# Patient Record
Sex: FEMALE | Race: WHITE | Employment: FULL TIME | URBAN - METROPOLITAN AREA
[De-identification: names, ages, dates, MRNs, and addresses within clinical notes are randomized per-mention and may not be internally consistent; named-entity substitution may affect disease eponyms.]

---

## 2017-01-17 ENCOUNTER — TELEPHONE (OUTPATIENT)
Dept: CARDIOLOGY CLINIC | Age: 62
End: 2017-01-17

## 2017-01-17 NOTE — TELEPHONE ENCOUNTER
The patient called to let you know the her BP is not improving. She is experiencing high BP. She can be reached at [phone]. Thanks!

## 2017-01-17 NOTE — TELEPHONE ENCOUNTER
Patient identified with 2 identifiers  Patient started full pill of spironolactone 25 mg on 12-31-17. /97 today, 134/85 later in day, states she has had many fluctuations past two weeks, associated with a headache. She is concerned and wants to know if she needs more blood pressure medication. Things she wants you to know: Both parents were treated for HTN  Norvasc causes her to have migraines  Lisinopril causes dizziness and a metalic taste  She has been treated in the past with propranolol for menstrual migraines, but had a very low Hr with it and Dr. Lujan Grew succesfully weaned her off, at which time she started spironolactone for the HTN. Please advise.

## 2017-01-18 NOTE — TELEPHONE ENCOUNTER
Patient identified with 2 identifiers  Returned patient phone call and instructed her per Dr. Eric Parker to continue to take blood pressures for next two weeks and call back with readings. Call before that for symptoms of chest pain, shortness of breath, extreme weakness, headache-extreme, or blood pressure that is suddenly not controlled. She states understanding.

## 2017-01-18 NOTE — TELEPHONE ENCOUNTER
I want her to watch her bp for 6 weeks on the spironolactone, so two more before we decide. Can take that long to have fulleffect. Check bp am and pm and keep the log.

## 2017-02-08 ENCOUNTER — TELEPHONE (OUTPATIENT)
Dept: CARDIOLOGY CLINIC | Age: 62
End: 2017-02-08

## 2017-02-08 NOTE — TELEPHONE ENCOUNTER
Left VMM with patient her blood pressure readings have been received. After Dr. Zachary Swanson has reviewed them we will call her back with an update.

## 2017-02-08 NOTE — TELEPHONE ENCOUNTER
Patient called regarding a fax she sent for her BP readings. Shes wants to verify it was received.  She can be reached at [phone]

## 2017-02-09 NOTE — TELEPHONE ENCOUNTER
Left message for patient that I have received readings and Dr. Machelle Pantoja has reviewed them. She wants her to continue to take her blood pressures and send in readings in 2 more months, patient to call back for problems or questions.

## 2017-04-17 ENCOUNTER — TELEPHONE (OUTPATIENT)
Dept: CARDIOLOGY CLINIC | Age: 62
End: 2017-04-17

## 2017-04-17 NOTE — TELEPHONE ENCOUNTER
Left message for patient that we have received her blood pressure readings by fax and will be in touch once Dr. Princess Winston has reviewed them.

## 2017-04-19 ENCOUNTER — DOCUMENTATION ONLY (OUTPATIENT)
Dept: CARDIOLOGY CLINIC | Age: 62
End: 2017-04-19

## 2017-04-19 ENCOUNTER — TELEPHONE (OUTPATIENT)
Dept: CARDIOLOGY CLINIC | Age: 62
End: 2017-04-19

## 2017-04-19 NOTE — TELEPHONE ENCOUNTER
Left message for patient to return call for questions, otherwise, left message on voicemail with full name that blood pressures look good and to continue current treatment.

## 2017-04-19 NOTE — PROGRESS NOTES
Please let her know that Dr. Jagdish Quintana reviewed her BP readings and no changes need to be made for now.

## 2017-05-04 ENCOUNTER — OFFICE VISIT (OUTPATIENT)
Dept: INTERNAL MEDICINE CLINIC | Age: 62
End: 2017-05-04

## 2017-05-04 VITALS
HEIGHT: 66 IN | RESPIRATION RATE: 16 BRPM | OXYGEN SATURATION: 94 % | SYSTOLIC BLOOD PRESSURE: 122 MMHG | BODY MASS INDEX: 20.38 KG/M2 | HEART RATE: 63 BPM | DIASTOLIC BLOOD PRESSURE: 86 MMHG | WEIGHT: 126.8 LBS

## 2017-05-04 DIAGNOSIS — R73.09 ELEVATED GLUCOSE: Primary | ICD-10-CM

## 2017-05-04 LAB — HBA1C MFR BLD HPLC: 5.6 %

## 2017-05-04 NOTE — PROGRESS NOTES
HPI:  Yaneli Murphy is a 64y.o. year old female who returns to clinic today for follow up:   Elevated sugar: last a1c 5.8. She does decrease sugar and carbs but still having some in her diet. Exercise: walking. HTN: followed by cardiology and well controlled on her current medication. Current Outpatient Prescriptions   Medication Sig Dispense Refill    spironolactone (ALDACTONE) 25 mg tablet Take 1 Tab by mouth daily. 90 Tab 3    ASPIRIN/SALICYLAMIDE/CAFFEINE (BC HEADACHE POWDER PO) Take  by mouth three (3) times daily as needed.  CHOLECALCIFEROL, VITAMIN D3, (VITAMIN D3 PO) Take 400 Units by mouth two (2) times a day.  CALCIUM PO Take 630 mg by mouth two (2) times a day. With vitamin d 500 mg      fluticasone (FLONASE) 50 mcg/actuation nasal spray 2 Sprays by Both Nostrils route daily. (Patient taking differently: 2 Sprays by Both Nostrils route daily as needed.) 1 Bottle 5    ascorbic acid (VITAMIN C) 500 mg tablet Take 500 mg by mouth two (2) times a day.  BIOTIN PO Take 5,000 mcg by mouth daily.  DOCUSATE CALCIUM (STOOL SOFTENER PO) Take  by mouth.  tretinoin, emollient, (RENOVA) 0.02 % topical cream Apply  to affected area nightly.  vitamin E (AQUA GEMS) 400 unit capsule Take  by mouth daily.  fiber Cap Take  by mouth. Allergies   Allergen Reactions    Sulfa (Sulfonamide Antibiotics) Rash     Social History   Substance Use Topics    Smoking status: Former Smoker     Packs/day: 1.00     Years: 4.00     Quit date: 1/1/1978    Smokeless tobacco: Never Used    Alcohol use 2.4 oz/week     4 Glasses of wine, 0 Standard drinks or equivalent per week      Comment: socially         Review of Systems   Respiratory: Negative for shortness of breath. Cardiovascular: Negative for chest pain and leg swelling. Gastrointestinal: Negative for abdominal pain and heartburn. Genitourinary: Negative for dysuria, frequency and urgency.        Physical Exam   Constitutional: She appears well-developed. No distress. /86  Pulse 63  Resp 16  Ht 5' 5.5\" (1.664 m)  Wt 126 lb 12.8 oz (57.5 kg)  LMP 04/01/2007  SpO2 94%  BMI 20.78 kg/m2   Cardiovascular: Normal rate, regular rhythm, normal heart sounds and intact distal pulses. No murmur heard. Pulmonary/Chest: Effort normal and breath sounds normal. She has no wheezes. Abdominal: Soft. Bowel sounds are normal. She exhibits no distension and no mass. There is no tenderness. Musculoskeletal: She exhibits no edema. Vitals reviewed. Assessment & Plan:    ICD-10-CM ICD-9-CM    1. Elevated glucose R73.09 790.29 AMB POC HEMOGLOBIN A1C      LIPID PANEL      METABOLIC PANEL, COMPREHENSIVE      CBC WITH AUTOMATED DIFF   a1c improved to 5.6. Continue with low sugar, low carbohydrate diet, and exercise. Follow-up Disposition:  Return for keep upcoming physical appt. .   Advised her to call back or return to office if symptoms worsen/change/persist.  Discussed expected course/resolution/complications of diagnosis in detail with patient. Medication risks/benefits/costs/interactions/alternatives discussed with patient. She was given an after visit summary which includes diagnoses, current medications, & vitals. She expressed understanding with the diagnosis and plan.

## 2017-05-08 ENCOUNTER — TELEPHONE (OUTPATIENT)
Dept: INTERNAL MEDICINE CLINIC | Age: 62
End: 2017-05-08

## 2017-05-10 NOTE — TELEPHONE ENCOUNTER
Pt calling asking does A1C in the office have same accuracy as blood draw and what is the length of the accumulation for the [facility]. Informed pt yes it does have same accuracy as blood draw and it is an accumulation of 3 months. Pt verbalized understanding.

## 2017-05-30 ENCOUNTER — HOSPITAL ENCOUNTER (OUTPATIENT)
Dept: MAMMOGRAPHY | Age: 62
Discharge: HOME | End: 2017-05-30
Attending: OBSTETRICS & GYNECOLOGY
Payer: COMMERCIAL

## 2017-05-30 DIAGNOSIS — Z12.31 VISIT FOR SCREENING MAMMOGRAM: ICD-10-CM

## 2017-05-30 PROCEDURE — 77067 SCR MAMMO BI INCL CAD: CPT

## 2017-06-01 ENCOUNTER — OFFICE VISIT (OUTPATIENT)
Dept: CARDIOLOGY CLINIC | Age: 62
End: 2017-06-01

## 2017-06-01 VITALS
RESPIRATION RATE: 16 BRPM | BODY MASS INDEX: 20.7 KG/M2 | HEART RATE: 60 BPM | HEIGHT: 66 IN | DIASTOLIC BLOOD PRESSURE: 80 MMHG | WEIGHT: 128.8 LBS | SYSTOLIC BLOOD PRESSURE: 150 MMHG

## 2017-06-01 DIAGNOSIS — I10 ESSENTIAL HYPERTENSION: Primary | ICD-10-CM

## 2017-06-01 PROBLEM — R00.1 SINUS BRADYCARDIA: Status: ACTIVE | Noted: 2017-06-01

## 2017-06-01 RX ORDER — SPIRONOLACTONE 25 MG/1
25 TABLET ORAL DAILY
Qty: 90 TAB | Refills: 3 | Status: SHIPPED | OUTPATIENT
Start: 2017-06-01 | End: 2018-03-06 | Stop reason: SDUPTHER

## 2017-06-01 NOTE — PROGRESS NOTES
[place]: Ling Velazquez  [phone]   best by MD  HPI: Glenda Mckinnon, a 58y.o. year-old who presents for follow-up on her HTN and bradycardia. Bradycardia has resolved off propanolol  Reviewed her BP readings today - had some SBP in the 140mmHg range on a particularly stressful day  Had one SBP in the 150mmHg range when she was rushing to an appointment  Taking spironolactone without difficulty, plans to have CMP checked with Dr. Margarita Lehman next month  Denies any chest pain or dyspnea with exertion  No palpitations  Denies headaches or dizziness  No syncope  Exercises daily - walks x 30 minutes to 1 hour   Denies any LE edema  Uses No Salt on her food, washes any canned vegetables that she eats     Assessment/Plan:  1. Bradycardia- rate 60 today, looks fine, resolved off propranolol  2. White coat HTN- /80 today in the office, most home readings with -130mmHg on spironolactone 25mg daily, no changes for now, advised her to check her BP twice daily for a few weeks leading up to her annual follow up next June 2018   -hx of migraine headaches on norvasc (she took it for a few weeks)  -hx of dizziness and metallic taste with lisinopril  -hx of bradycardia on propanolol  3. Dyslipidemia- LDL 99 in 2015, plans to have fasting lipids checked by Dr. Margarita Lehman next month   4. Abnormal ECG - non-specific ST-T wave changes today, likely related to lead placement, asymptomatic currently     Echo EF 73%, mild MR  Holter 43-87 PAC    Soc one glass of wine a day, no tobacco  FHx no early CAD    She  has a past medical history of Colonic polyp (7/05); Cough variant asthma ( ); Degenerative arthritis of lumbar spine (3/11); Insomnia, unspecified ( ); Menstrual migraine ( ); Other and unspecified hyperlipidemia; Rectocele; and Vasovagal syncope (3/24/2016).     Cardiovascular ROS: no chest pain or dyspnea on exertion  Respiratory ROS: no cough, shortness of breath, or wheezing  Neurological ROS: no TIA or stroke symptoms  All other systems negative except as above. PE  Vitals:    06/01/17 1137   BP: 150/80   Pulse: 60   Resp: 16   Weight: 128 lb 12.8 oz (58.4 kg)   Height: 5' 5.5\" (1.664 m)    Body mass index is 21.11 kg/(m^2).    General appearance - alert, well appearing, and in no distress  Mental status - affect appropriate to mood  Eyes - sclera anicteric, moist mucous membranes  Neck - supple, no significant adenopathy  Lymphatics - no  lymphadenopathy  Chest - clear to auscultation, no wheezes, rales or rhonchi  Heart - normal rate, regular rhythm, normal S1, S2, no murmurs, rubs, clicks or gallops  Abdomen - soft, nontender, nondistended, no masses or organomegaly  Back exam - full range of motion, no tenderness  Neurological - cranial nerves II through XII grossly intact, no focal deficit  Musculoskeletal - no muscular tenderness noted, normal strength  Extremities - peripheral pulses normal, no pedal edema  Skin - normal coloration  no rashes    12 lead ECG: NSR with non-specific ST-T wave changes     Recent Labs:  Lab Results   Component Value Date/Time    Cholesterol, total 223 06/20/2017 08:10 AM    HDL Cholesterol 97 06/20/2017 08:10 AM    LDL, calculated 112 06/20/2017 08:10 AM    Triglyceride 71 06/20/2017 08:10 AM    CHOL/HDL Ratio 2.0 05/21/2009 09:59 AM     Lab Results   Component Value Date/Time    Creatinine 0.83 06/20/2017 08:10 AM     Lab Results   Component Value Date/Time    BUN 20 06/20/2017 08:10 AM     Lab Results   Component Value Date/Time    Potassium 4.3 06/20/2017 08:10 AM     Lab Results   Component Value Date/Time    Hemoglobin A1c 5.8 06/16/2016 08:02 AM    Hemoglobin A1c, External 5.8 04/15/2016     Lab Results   Component Value Date/Time    HGB 15.4 06/20/2017 08:10 AM     Lab Results   Component Value Date/Time    PLATELET 885 40/02/6460 08:10 AM       Reviewed:  Past Medical History:   Diagnosis Date    Colonic polyp 7/05         Cough variant asthma      Degenerative arthritis of lumbar spine 3/11    Eduardo/os    Insomnia, unspecified      Menstrual migraine      Other and unspecified hyperlipidemia     mild    Rectocele     Vasovagal syncope 3/24/2016     History   Smoking Status    Former Smoker    Packs/day: 1.00    Years: 4.00    Quit date: 1/1/1978   Smokeless Tobacco    Never Used     History   Alcohol Use    2.4 oz/week    4 Glasses of wine, 0 Standard drinks or equivalent per week     Comment: socially     Allergies   Allergen Reactions    Sulfa (Sulfonamide Antibiotics) Rash       Current Outpatient Prescriptions   Medication Sig    CALCIUM CITRATE PO Take 630 mg by mouth two (2) times a day.  spironolactone (ALDACTONE) 25 mg tablet Take 1 Tab by mouth daily.  ASPIRIN/SALICYLAMIDE/CAFFEINE (BC HEADACHE POWDER PO) Take  by mouth three (3) times daily as needed.  CHOLECALCIFEROL, VITAMIN D3, (VITAMIN D3 PO) Take 900 Units by mouth two (2) times a day.  fluticasone (FLONASE) 50 mcg/actuation nasal spray 2 Sprays by Both Nostrils route daily. (Patient taking differently: 2 Sprays by Both Nostrils route daily as needed.)    ascorbic acid (VITAMIN C) 500 mg tablet Take 500 mg by mouth two (2) times a day.  BIOTIN PO Take 5,000 mcg by mouth daily.  DOCUSATE CALCIUM (STOOL SOFTENER PO) Take  by mouth.  tretinoin, emollient, (RENOVA) 0.02 % topical cream Apply  to affected area nightly.  vitamin E (AQUA GEMS) 400 unit capsule Take  by mouth daily.  fiber Cap Take  by mouth. No current facility-administered medications for this visit.         Nitin Yadav MD  [facility] [place]  [address] [facility] [address]  [address]

## 2017-06-01 NOTE — PATIENT INSTRUCTIONS
Please check your blood pressure twice daily for a few weeks just prior to your next appointment  You don't need to check it regularly for now

## 2017-06-01 NOTE — MR AVS SNAPSHOT
Visit Information Date & Time Provider Department Dept. Phone Encounter #  
 6/1/2017 11:20 AM Brandyn Britt MD [facility] [phone] [id number] Your Appointments 6/27/2017  9:15 AM  
WELL WOMAN EXAM with Sandra Andersen MD  
[facility]) Appt Note: cpe no pap  
 [address] [facility] [address]  
[address] [address] [address] [place] Upcoming Health Maintenance Date Due  
 PAP AKA CERVICAL CYTOLOGY 3/26/2017 INFLUENZA AGE 9 TO ADULT 8/1/2017 BREAST CANCER SCRN MAMMOGRAM 5/30/2019 COLONOSCOPY 10/18/2021 DTaP/Tdap/Td series (2 - Td) 5/8/2024 Allergies as of 6/1/2017  Review Complete On: 6/1/2017 By: Laura Harley Severity Noted Reaction Type Reactions Sulfa (Sulfonamide Antibiotics)  03/03/2010    Rash Current Immunizations  Reviewed on 6/21/2016 Name Date Influenza Vaccine 10/1/2015 Pneumococcal Polysaccharide (PPSV-23) 6/16/2015  9:46 AM  
 Td 5/5/2006 Tdap 5/8/2014 Not reviewed this visit You Were Diagnosed With   
  
 Codes Comments Essential hypertension    -  Primary ICD-10-CM: I10 
ICD-9-CM: 401.9 Vitals BP Pulse Resp Height(growth percentile) Weight(growth percentile) LMP  
 150/80 (BP 1 Location: Right arm, BP Patient Position: Sitting) 60 16 5' 5.5\" (1.664 m) 128 lb 12.8 oz (58.4 kg) 04/01/2007 BMI OB Status Smoking Status 21.11 kg/m2 Postmenopausal Former Smoker Vitals History BMI and BSA Data Body Mass Index Body Surface Area  
 21.11 kg/m 2 1.64 m 2 Preferred Pharmacy Pharmacy Name Phone [facility]  [place], [address] [facility]. [phone] Your Updated Medication List  
  
   
This list is accurate as of: 6/1/17 12:09 PM.  Always use your most recent med list.  
  
  
  
  
 BC HEADACHE POWDER PO Take  by mouth three (3) times daily as needed. BIOTIN PO Take 5,000 mcg by mouth daily. CALCIUM CITRATE PO Take 630 mg by mouth two (2) times a day. fiber Cap Take  by mouth. fluticasone 50 mcg/actuation nasal spray Commonly known as:  Jose Marrow 2 Sprays by Both Nostrils route daily. RENOVA 0.02 % topical cream  
Generic drug:  tretinoin (emollient) Apply  to affected area nightly. spironolactone 25 mg tablet Commonly known as:  ALDACTONE Take 1 Tab by mouth daily. STOOL SOFTENER PO Take  by mouth. VITAMIN C 500 mg tablet Generic drug:  ascorbic acid (vitamin C) Take 500 mg by mouth two (2) times a day. VITAMIN D3 PO Take 900 Units by mouth two (2) times a day. vitamin E 400 unit capsule Commonly known as:  [address] Take  by mouth daily. Prescriptions Printed Refills  
 spironolactone (ALDACTONE) 25 mg tablet 3 Sig: Take 1 Tab by mouth daily. Class: Print Route: Oral  
  
We Performed the Following AMB POC EKG ROUTINE W/ 12 LEADS, INTER & REP [84315 CPT(R)] Patient Instructions Please check your blood pressure twice daily for a few weeks just prior to your next appointment You don't need to check it regularly for now Introducing [facility]! Dear Morro Galan: 
Thank you for requesting a Bilbus account. Our records indicate that you already have an active Bilbus account. You can access your account anytime at [email or web address] Did you know that you can access your hospital and ER discharge instructions at any time in Bilbus? You can also review all of your test results from your hospital stay or ER visit. Additional Information If you have questions, please visit the Frequently Asked Questions section of the Bilbus website at [email or web address] Remember, MyChart is NOT to be used for urgent needs. For medical emergencies, dial 911. Now available from your iPhone and Android! Please provide this summary of care documentation to your next provider. Your primary care clinician is listed as Marianela Pena. If you have any questions after today's visit, please call [phone].

## 2017-06-21 LAB
ALBUMIN SERPL-MCNC: 4.4 G/DL (ref 3.6–4.8)
ALBUMIN/GLOB SERPL: 2.4 {RATIO} (ref 1.2–2.2)
ALP SERPL-CCNC: 60 IU/L (ref 39–117)
ALT SERPL-CCNC: 15 IU/L (ref 0–32)
AST SERPL-CCNC: 23 IU/L (ref 0–40)
BASOPHILS # BLD AUTO: 0 X10E3/UL (ref 0–0.2)
BASOPHILS NFR BLD AUTO: 1 %
BILIRUB SERPL-MCNC: 0.2 MG/DL (ref 0–1.2)
BUN SERPL-MCNC: 20 MG/DL (ref 8–27)
BUN/CREAT SERPL: 24 (ref 12–28)
CALCIUM SERPL-MCNC: 9.4 MG/DL (ref 8.7–10.3)
CHLORIDE SERPL-SCNC: 97 MMOL/L (ref 96–106)
CHOLEST SERPL-MCNC: 223 MG/DL (ref 100–199)
CO2 SERPL-SCNC: 23 MMOL/L (ref 18–29)
CREAT SERPL-MCNC: 0.83 MG/DL (ref 0.57–1)
EOSINOPHIL # BLD AUTO: 0.1 X10E3/UL (ref 0–0.4)
EOSINOPHIL NFR BLD AUTO: 3 %
ERYTHROCYTE [DISTWIDTH] IN BLOOD BY AUTOMATED COUNT: 13.4 % (ref 12.3–15.4)
GLOBULIN SER CALC-MCNC: 1.8 G/DL (ref 1.5–4.5)
GLUCOSE SERPL-MCNC: 87 MG/DL (ref 65–99)
HCT VFR BLD AUTO: 44.7 % (ref 34–46.6)
HDLC SERPL-MCNC: 97 MG/DL
HGB BLD-MCNC: 15.4 G/DL (ref 11.1–15.9)
IMM GRANULOCYTES # BLD: 0 X10E3/UL (ref 0–0.1)
IMM GRANULOCYTES NFR BLD: 0 %
LDLC SERPL CALC-MCNC: 112 MG/DL (ref 0–99)
LYMPHOCYTES # BLD AUTO: 1.6 X10E3/UL (ref 0.7–3.1)
LYMPHOCYTES NFR BLD AUTO: 27 %
MCH RBC QN AUTO: 32.4 PG (ref 26.6–33)
MCHC RBC AUTO-ENTMCNC: 34.5 G/DL (ref 31.5–35.7)
MCV RBC AUTO: 94 FL (ref 79–97)
MONOCYTES # BLD AUTO: 0.4 X10E3/UL (ref 0.1–0.9)
MONOCYTES NFR BLD AUTO: 7 %
NEUTROPHILS # BLD AUTO: 3.5 X10E3/UL (ref 1.4–7)
NEUTROPHILS NFR BLD AUTO: 62 %
PLATELET # BLD AUTO: 295 X10E3/UL (ref 150–379)
POTASSIUM SERPL-SCNC: 4.3 MMOL/L (ref 3.5–5.2)
PROT SERPL-MCNC: 6.2 G/DL (ref 6–8.5)
RBC # BLD AUTO: 4.76 X10E6/UL (ref 3.77–5.28)
SODIUM SERPL-SCNC: 137 MMOL/L (ref 134–144)
TRIGL SERPL-MCNC: 71 MG/DL (ref 0–149)
VLDLC SERPL CALC-MCNC: 14 MG/DL (ref 5–40)
WBC # BLD AUTO: 5.7 X10E3/UL (ref 3.4–10.8)

## 2017-06-27 ENCOUNTER — OFFICE VISIT (OUTPATIENT)
Dept: INTERNAL MEDICINE CLINIC | Age: 62
End: 2017-06-27

## 2017-06-27 VITALS
SYSTOLIC BLOOD PRESSURE: 146 MMHG | DIASTOLIC BLOOD PRESSURE: 82 MMHG | RESPIRATION RATE: 16 BRPM | HEIGHT: 66 IN | BODY MASS INDEX: 20.41 KG/M2 | TEMPERATURE: 97.7 F | HEART RATE: 76 BPM | WEIGHT: 127 LBS

## 2017-06-27 DIAGNOSIS — I10 WHITE COAT SYNDROME WITH DIAGNOSIS OF HYPERTENSION: ICD-10-CM

## 2017-06-27 DIAGNOSIS — Z00.00 ROUTINE GENERAL MEDICAL EXAMINATION AT A HEALTH CARE FACILITY: Primary | ICD-10-CM

## 2017-06-27 DIAGNOSIS — R94.31 ABNORMAL EKG: ICD-10-CM

## 2017-06-27 NOTE — PROGRESS NOTES
Subjective:   58 y.o. female for Well Woman Check. Her gyne and breast care is done elsewhere by her Ob-Gyne physician. Health maintenance reviewed and updated with patient today at visit. Exercise: walking  Reviewed lab work with her today and lab work is at goal except for mild elevation of LDL. HTN: has white coat HTN in office and BP at home 120-130's. She has recently seen Dr Donald Stallings in cardiology and doing will on spironolactone. Current Outpatient Prescriptions   Medication Sig Dispense Refill    CALCIUM CITRATE PO Take 630 mg by mouth two (2) times a day.  spironolactone (ALDACTONE) 25 mg tablet Take 1 Tab by mouth daily. 90 Tab 3    ASPIRIN/SALICYLAMIDE/CAFFEINE (BC HEADACHE POWDER PO) Take  by mouth three (3) times daily as needed.  CHOLECALCIFEROL, VITAMIN D3, (VITAMIN D3 PO) Take 900 Units by mouth two (2) times a day.  fluticasone (FLONASE) 50 mcg/actuation nasal spray 2 Sprays by Both Nostrils route daily. (Patient taking differently: 2 Sprays by Both Nostrils route daily as needed.) 1 Bottle 5    ascorbic acid (VITAMIN C) 500 mg tablet Take 500 mg by mouth two (2) times a day.  BIOTIN PO Take 5,000 mcg by mouth daily.  DOCUSATE CALCIUM (STOOL SOFTENER PO) Take  by mouth.  tretinoin, emollient, (RENOVA) 0.02 % topical cream Apply  to affected area nightly.  fiber Cap Take  by mouth.  vitamin E (AQUA GEMS) 400 unit capsule Take  by mouth daily.        Allergies   Allergen Reactions    Sulfa (Sulfonamide Antibiotics) Rash     Past Medical History:   Diagnosis Date    Colonic polyp 7/05         Cough variant asthma      Degenerative arthritis of lumbar spine 3/11    Eduardo/os    Insomnia, unspecified      Menstrual migraine      Other and unspecified hyperlipidemia     mild    Rectocele     Vasovagal syncope 3/24/2016     Past Surgical History:   Procedure Laterality Date    COLONOSCOPY  2009, 5/11    Vorenberg/sol    HX GYN  1990s    tubal ligation      Family History   Problem Relation Age of Onset    Hypertension Mother     Psychiatric Disorder Father      depression/committed suicide   Idania Sloop Depression Father     Hypertension Father     Diabetes Sister     Cancer Sister      pancreatic    Depression Sister      Social History   Substance Use Topics    Smoking status: Former Smoker     Packs/day: 1.00     Years: 4.00     Quit date: 1/1/1978    Smokeless tobacco: Never Used    Alcohol use 2.4 oz/week     4 Glasses of wine, 0 Standard drinks or equivalent per week      Comment: socially        Specific concerns today: she had an abnormal EKG done at cardiologist visit and has been concerned ever since as there were some abnormalities and there may have been problem with lead placement. She request further evaluation. Review of Systems  Review of Systems   Constitutional: Negative for chills, fever and malaise/fatigue. HENT: Negative for congestion, ear pain, hearing loss and sore throat. Eyes: Negative for blurred vision and double vision. Respiratory: Negative for cough, shortness of breath and wheezing. Cardiovascular: Positive for palpitations (occasional flutter at night lasting a few seconds when she is in bed. not with any exertion. ). Negative for chest pain and leg swelling. Gastrointestinal: Negative for abdominal pain, blood in stool, constipation, diarrhea, heartburn, nausea and vomiting. Genitourinary: Positive for frequency (related to diuretic). Negative for dysuria and urgency. Musculoskeletal: Positive for back pain (mild low back pain on and off). Negative for joint pain and myalgias. Skin: Negative for rash. Neurological: Negative for dizziness, sensory change, focal weakness and headaches. Psychiatric/Behavioral: Negative for depression. The patient is not nervous/anxious and does not have insomnia.         Objective:     Visit Vitals    /82    Pulse 76    Temp 97.7 °F (36.5 °C) (Oral)    Resp 16    Ht 5' 5.5\" (1.664 m)    Wt 127 lb (57.6 kg)    LMP 04/01/2007    BMI 20.81 kg/m2     GENERAL:  Pleasant female in no apparent distress. HEENT:  Normocephalic, atraumatic. Sinuses nontender. Posterior Pharynx clear, no erythema. NECK:  Supple, full range of motion. No thyromegaly or adenopathy. No carotid bruits auscultated. BACK:  Nontender. RESPIRATORY:  Lungs clear to auscultation bilaterally without wheezes or crackles. CARDIAC:  Regular rate and rhythm, no murmurs, rubs or gallops. BREASTS:  No dimples, retraction, color changes, nipple discharge, or masses present. No supra- or infraclavicular or axillary nodes palpable. ABDOMEN:  Soft, nontender. Positive bowel sounds. No masses. No hepatosplenomegaly. EXTREMITIES: Peripheral pulses are symmetric and palpable. No cyanosis, clubbing, or edema. NEUROLOGIC:  Nonfocal.   SKIN: Normal appearance. Assessment/Plan:       ICD-10-CM ICD-9-CM    1. Routine general medical examination at a health care facility  Counseled continue healthy lifestyle, exercise, diet and weight. Z00.00 V70.0    2. Abnormal EKG  Repeat EKG is fine with some mild bradycardia and was likely lead placement causing the previous change as her cardiologist thought.  R94.31 794.31 AMB POC EKG ROUTINE W/ 12 LEADS, INTER & REP   3. White coat syndrome with diagnosis of hypertension  Will continue current medication as per cardiology I10 401.9       Orders Placed This Encounter    AMB POC EKG ROUTINE W/ 12 LEADS, INTER & REP    I have discussed the diagnosis with the patient and the intended plan as seen in the above orders. The patient has received an after-visit summary and questions were answered concerning future plans. I have discussed medication side effects and warnings with the patient as well.   She has expressed understanding of the diagnosis and plan    Follow-up Disposition:  Return in about 1 year (around 6/27/2018) for physical.

## 2017-06-27 NOTE — PROGRESS NOTES
Chief Complaint   Patient presents with    Complete Physical     1. Have you been to the ER, urgent care clinic since your last visit? Hospitalized since your last visit? No    2. Have you seen or consulted any other health care providers outside of the [facility] since your last visit? Include any pap smears or colon screening.  No

## 2017-07-03 NOTE — PROGRESS NOTES
My chart message sent that lab work fine except some elevated cholesterol. Lifestyle changes recommended.

## 2017-12-06 ENCOUNTER — TELEPHONE (OUTPATIENT)
Dept: CARDIOLOGY CLINIC | Age: 62
End: 2017-12-06

## 2017-12-06 NOTE — TELEPHONE ENCOUNTER
Patient called stating she is experiencing very high BP and would like to have her appt moved up to sooner date. She can be reached at [phone].  Thank you

## 2017-12-06 NOTE — TELEPHONE ENCOUNTER
LVM for patient to return call at earliest convenience. Patient returned call, 2 pt identifiers used  She is requesting to be seen earlier due to elevated blood pressures.  Scheduled her to be seen sooner:    Future Appointments  Date Time Provider Ludwig Jacobs   12/19/2017 10:20 AM Ester Stephenson  [address]   6/28/2018 2:00 PM Charlotte Calderon MD [id number] [facility]

## 2017-12-19 ENCOUNTER — OFFICE VISIT (OUTPATIENT)
Dept: CARDIOLOGY CLINIC | Age: 62
End: 2017-12-19

## 2017-12-19 VITALS
WEIGHT: 131.6 LBS | BODY MASS INDEX: 21.15 KG/M2 | HEART RATE: 64 BPM | SYSTOLIC BLOOD PRESSURE: 156 MMHG | DIASTOLIC BLOOD PRESSURE: 100 MMHG | HEIGHT: 66 IN

## 2017-12-19 DIAGNOSIS — I10 WHITE COAT SYNDROME WITH DIAGNOSIS OF HYPERTENSION: Primary | ICD-10-CM

## 2017-12-19 DIAGNOSIS — R00.1 SINUS BRADYCARDIA: ICD-10-CM

## 2017-12-19 RX ORDER — LOSARTAN POTASSIUM 25 MG/1
25 TABLET ORAL DAILY
Qty: 90 TAB | Refills: 3 | Status: SHIPPED | OUTPATIENT
Start: 2017-12-19 | End: 2018-02-16 | Stop reason: ALTCHOICE

## 2017-12-19 RX ORDER — DOCUSATE SODIUM 100 MG/1
200 CAPSULE, LIQUID FILLED ORAL DAILY
COMMUNITY
End: 2018-03-09 | Stop reason: ALTCHOICE

## 2017-12-19 NOTE — MR AVS SNAPSHOT
Visit Information Date & Time Provider Department Dept. Phone Encounter #  
 12/19/2017 10:20 AM Bishop Holland MD [facility] Junior Monson [phone] [id number] Your Appointments 6/28/2018  2:00 PM  
WELL WOMAN EXAM with Kavon Ching MD  
[facility] Internal Medicine [facility]) Appt Note: cpe  
 [address] [facility] [address]  
[address] [place] Upcoming Health Maintenance Date Due Influenza Age 5 to Adult 8/1/2017 PAP AKA CERVICAL CYTOLOGY 6/2/2020 COLONOSCOPY 10/18/2021 DTaP/Tdap/Td series (2 - Td) 5/8/2024 Allergies as of 12/19/2017  Review Complete On: 12/19/2017 By: She Boyer Severity Noted Reaction Type Reactions Sulfa (Sulfonamide Antibiotics)  03/03/2010    Rash Current Immunizations  Reviewed on 6/21/2016 Name Date Influenza Vaccine 10/1/2015 Pneumococcal Polysaccharide (PPSV-23) 6/16/2015  9:46 AM  
 Td 5/5/2006 Tdap 5/8/2014 Not reviewed this visit You Were Diagnosed With   
  
 Codes Comments White coat syndrome with diagnosis of hypertension    -  Primary ICD-10-CM: I10 
ICD-9-CM: 401.9 Sinus bradycardia     ICD-10-CM: R00.1 ICD-9-CM: 427.89 Vitals BP Pulse Height(growth percentile) Weight(growth percentile) LMP BMI  
 (!) 156/100 (BP 1 Location: Right arm, BP Patient Position: Sitting) 64 5' 5.5\" (1.664 m) 131 lb 9.6 oz (59.7 kg) 04/01/2007 21.57 kg/m2 OB Status Smoking Status Postmenopausal Former Smoker Vitals History BMI and BSA Data Body Mass Index Body Surface Area  
 21.57 kg/m 2 1.66 m 2 Preferred Pharmacy Pharmacy Name Phone [facility] [place], Asa Bennett R[facility]. [phone] Your Updated Medication List  
  
   
 This list is accurate as of: 12/19/17 10:42 AM.  Always use your most recent med list.  
  
  
  
  
 BC HEADACHE POWDER PO Take  by mouth three (3) times daily as needed. BIOTIN PO Take 500 mcg by mouth daily. CALCIUM CITRATE PO Take 630 mg by mouth two (2) times a day. fiber Cap Take 2 Caps by mouth two (2) times a day. fluticasone 50 mcg/actuation nasal spray Commonly known as:  Obi Merles 2 Sprays by Both Nostrils route daily. losartan 25 mg tablet Commonly known as:  COZAAR Take 1 Tab by mouth daily. RENOVA 0.02 % topical cream  
Generic drug:  tretinoin (emollient) Apply  to affected area nightly. spironolactone 25 mg tablet Commonly known as:  ALDACTONE Take 1 Tab by mouth daily. STOOL SOFTENER PO Take  by mouth. docusate sodium 100 mg capsule Commonly known as:  Elke Lowers Take 200 mg by mouth daily. VITAMIN C 500 mg tablet Generic drug:  ascorbic acid (vitamin C) Take 500 mg by mouth two (2) times a day. VITAMIN D3 PO Take 900 Units by mouth two (2) times a day. vitamin E 400 unit capsule Commonly known as:  [address] Take  by mouth daily. Prescriptions Sent to Pharmacy Refills  
 losartan (COZAAR) 25 mg tablet 3 Sig: Take 1 Tab by mouth daily. Class: Normal  
 Pharmacy: [address], [address] [place] [address]. Ph #: [phone] Route: Oral  
  
We Performed the Following MAGNESIUM [id number] CPT(R)] METABOLIC PANEL, BASIC [75241 CPT(R)] Introducing [facility]! Dear Georgina Mcclelland: 
Thank you for requesting a [employer] account. Our records indicate that you already have an active [employer] account. You can access your account anytime at [email or web address] Did you know that you can access your hospital and ER discharge instructions at any time in [employer]?   You can also review all of your test results from your hospital stay or ER visit. Additional Information If you have questions, please visit the Frequently Asked Questions section of the [employer] website at [email or web address] Remember, [employer] is NOT to be used for urgent needs. For medical emergencies, dial 911. Now available from your iPhone and Android! Please provide this summary of care documentation to your next provider. Your primary care clinician is listed as Clent Leventhal. If you have any questions after today's visit, please call [phone].

## 2017-12-19 NOTE — PROGRESS NOTES
[address]: Betsy Metcalf  [phone]   best by MD  This note will not be viewable in [address] HPI: Lizet Traylor, a 58y.o. year-old who presents for follow-up on her HTN and bradycardia. Bradycardia has resolved off propanolol  Last visit feeling very well. Now concerned that her bp is creeping up. Reviewed her home log. Discussed available agents. Will start losartan and check labs in one month for crt, k on combo of spironolactone and losartan. Denies any chest pain or dyspnea with exertion  No palpitations  Denies headaches or dizziness  No syncope  Exercises daily - walks x 30 minutes to 1 hour   Denies any LE edema  Uses No Salt on her food, washes any canned vegetables that she eats     Assessment/Plan:  1. Bradycardia- rate 60 today, looks fine, resolved off propranolol  2. White coat HTN- higher at home as well, add losartan   -hx of migraine headaches on norvasc (she took it for a few weeks)  -hx of dizziness and metallic taste with lisinopril  -hx of bradycardia on propanolol  3. Dyslipidemia- LDLup to 112, discussed diet, lifestyle  4. Abnormal ECG - non-specific ST-T wave changes, asymptomatic  5. IGT A1C 5.8, recently  5.6    Echo EF 73%, mild MR  Holter 43-87 PAC    Soc one glass of wine a day, no tobacco  FHx no early CAD    She  has a past medical history of Colonic polyp (7/05); Cough variant asthma ( ); Degenerative arthritis of lumbar spine (3/11); Insomnia, unspecified ( ); Menstrual migraine ( ); Other and unspecified hyperlipidemia; Rectocele; and Vasovagal syncope (3/24/2016). Cardiovascular ROS: no chest pain or dyspnea on exertion  Respiratory ROS: no cough, shortness of breath, or wheezing  Neurological ROS: no TIA or stroke symptoms  All other systems negative except as above.      PE  Vitals:    12/19/17 1017 12/19/17 1023   BP: (!) 160/100 (!) 156/100   Pulse: 64    Weight: 131 lb 9.6 oz (59.7 kg)    Height: 5' 5.5\" (1.664 m)     Body mass index is 21.57 kg/(m^2).    General appearance - alert, well appearing, and in no distress  Mental status - affect appropriate to mood  Eyes - sclera anicteric, moist mucous membranes  Neck - supple, no significant adenopathy  Lymphatics - no  lymphadenopathy  Chest - clear to auscultation, no wheezes, rales or rhonchi  Heart - normal rate, regular rhythm, normal S1, S2, no murmurs, rubs, clicks or gallops  Abdomen - soft, nontender, nondistended, no masses or organomegaly  Back exam - full range of motion, no tenderness  Neurological - cranial nerves II through XII grossly intact, no focal deficit  Musculoskeletal - no muscular tenderness noted, normal strength  Extremities - peripheral pulses normal, no pedal edema  Skin - normal coloration  no rashes    12 lead ECG: NSR with non-specific ST-T wave changes     Recent Labs:  Lab Results   Component Value Date/Time    Cholesterol, total 223 06/20/2017 08:10 AM    HDL Cholesterol 97 06/20/2017 08:10 AM    LDL, calculated 112 06/20/2017 08:10 AM    Triglyceride 71 06/20/2017 08:10 AM    CHOL/HDL Ratio 2.0 05/21/2009 09:59 AM     Lab Results   Component Value Date/Time    Creatinine 0.83 06/20/2017 08:10 AM     Lab Results   Component Value Date/Time    BUN 20 06/20/2017 08:10 AM     Lab Results   Component Value Date/Time    Potassium 4.3 06/20/2017 08:10 AM     Lab Results   Component Value Date/Time    Hemoglobin A1c 5.8 06/16/2016 08:02 AM    Hemoglobin A1c, External 5.8 04/15/2016     Lab Results   Component Value Date/Time    HGB 15.4 06/20/2017 08:10 AM     Lab Results   Component Value Date/Time    PLATELET 068 62/84/1824 08:10 AM       Reviewed:  Past Medical History:   Diagnosis Date    Colonic polyp 7/05         Cough variant asthma      Degenerative arthritis of lumbar spine 3/11    Eduardo/os    Insomnia, unspecified      Menstrual migraine      Other and unspecified hyperlipidemia     mild    Rectocele     Vasovagal syncope 3/24/2016     History   Smoking Status  Former Smoker    Packs/day: 1.00    Years: 4.00    Quit date: 1/1/1978   Smokeless Tobacco    Never Used     History   Alcohol Use    2.4 oz/week    4 Glasses of wine, 0 Standard drinks or equivalent per week     Comment: daily     Allergies   Allergen Reactions    Sulfa (Sulfonamide Antibiotics) Rash       Current Outpatient Prescriptions   Medication Sig    docusate sodium (COLACE) 100 mg capsule Take 200 mg by mouth daily.  CALCIUM CITRATE PO Take 630 mg by mouth two (2) times a day.  spironolactone (ALDACTONE) 25 mg tablet Take 1 Tab by mouth daily.  ASPIRIN/SALICYLAMIDE/CAFFEINE (BC HEADACHE POWDER PO) Take  by mouth three (3) times daily as needed.  CHOLECALCIFEROL, VITAMIN D3, (VITAMIN D3 PO) Take 900 Units by mouth two (2) times a day.  fluticasone (FLONASE) 50 mcg/actuation nasal spray 2 Sprays by Both Nostrils route daily. (Patient taking differently: 2 Sprays by Both Nostrils route daily as needed.)    ascorbic acid (VITAMIN C) 500 mg tablet Take 500 mg by mouth two (2) times a day.  BIOTIN PO Take 500 mcg by mouth daily.  tretinoin, emollient, (RENOVA) 0.02 % topical cream Apply  to affected area nightly.  vitamin E (AQUA GEMS) 400 unit capsule Take  by mouth daily.  fiber Cap Take 2 Caps by mouth two (2) times a day.  DOCUSATE CALCIUM (STOOL SOFTENER PO) Take  by mouth. No current facility-administered medications for this visit.         MD Nesha Garcia [facility] [place]  [address] [facility] [address]  [address]

## 2018-01-10 LAB
BUN SERPL-MCNC: 24 MG/DL (ref 8–27)
BUN/CREAT SERPL: 37 (ref 12–28)
CALCIUM SERPL-MCNC: 9 MG/DL (ref 8.7–10.3)
CHLORIDE SERPL-SCNC: 104 MMOL/L (ref 96–106)
CO2 SERPL-SCNC: 25 MMOL/L (ref 18–29)
CREAT SERPL-MCNC: 0.65 MG/DL (ref 0.57–1)
GLUCOSE SERPL-MCNC: 95 MG/DL (ref 65–99)
MAGNESIUM SERPL-MCNC: 2.4 MG/DL (ref 1.6–2.3)
POTASSIUM SERPL-SCNC: 4.4 MMOL/L (ref 3.5–5.2)
SODIUM SERPL-SCNC: 144 MMOL/L (ref 134–144)

## 2018-01-16 ENCOUNTER — TELEPHONE (OUTPATIENT)
Dept: CARDIOLOGY CLINIC | Age: 63
End: 2018-01-16

## 2018-01-16 NOTE — TELEPHONE ENCOUNTER
Patient returned my call. Verified identity. Informed patient that her Mg was very slightly elevated at 2.4. She has an appointment with Dr. Candice Aguilar on 1/30/18, advised patient to address these concerns at that time. She denies any symptoms and states she feels good.

## 2018-01-16 NOTE — TELEPHONE ENCOUNTER
Patient called in stating Dr ordered her to get blood work done after 3 weeks after starting her BP meds . Patient is concerned with Magnesium levels she saw on her MyChart. Please call to let her know if she needs to be concerned or not. Patient stated that she was very pleased with her BP medication.   Phone [phone]  SK

## 2018-01-30 ENCOUNTER — OFFICE VISIT (OUTPATIENT)
Dept: CARDIOLOGY CLINIC | Age: 63
End: 2018-01-30

## 2018-01-30 VITALS
WEIGHT: 131 LBS | HEART RATE: 64 BPM | HEIGHT: 66 IN | BODY MASS INDEX: 21.05 KG/M2 | DIASTOLIC BLOOD PRESSURE: 100 MMHG | SYSTOLIC BLOOD PRESSURE: 140 MMHG | RESPIRATION RATE: 16 BRPM

## 2018-01-30 DIAGNOSIS — I10 WHITE COAT SYNDROME WITH DIAGNOSIS OF HYPERTENSION: Primary | ICD-10-CM

## 2018-01-30 DIAGNOSIS — R55 VASOVAGAL SYNCOPE: ICD-10-CM

## 2018-01-30 DIAGNOSIS — I10 WHITE COAT SYNDROME WITH DIAGNOSIS OF HYPERTENSION: ICD-10-CM

## 2018-01-30 DIAGNOSIS — R43.8 BAD TASTE IN MOUTH: ICD-10-CM

## 2018-01-30 DIAGNOSIS — E78.5 DYSLIPIDEMIA: ICD-10-CM

## 2018-01-30 DIAGNOSIS — R00.1 SINUS BRADYCARDIA: Primary | ICD-10-CM

## 2018-01-30 DIAGNOSIS — R94.31 EKG ABNORMALITIES: ICD-10-CM

## 2018-01-30 DIAGNOSIS — R00.1 SINUS BRADYCARDIA: ICD-10-CM

## 2018-01-30 NOTE — MR AVS SNAPSHOT
[address] [facility] [address] [facility] 
[phone] Patient: Remedios White MRN: [id number] QMR:0/6/0852 Visit Information Date & Time Provider Department Dept. Phone Encounter #  
 1/30/2018  8:00 AM Paola Nava MD [facility] Merlin Furth [phone] [id number] Your Appointments 6/28/2018  2:00 PM  
WELL WOMAN EXAM with Veronica Peralta MD  
[facility] Internal Medicine [facility]) Appt Note: cpe  
 [address] [facility] [address]  
[address] [place] Upcoming Health Maintenance Date Due Influenza Age 5 to Adult 8/1/2017 BREAST CANCER SCRN MAMMOGRAM 5/30/2019 PAP AKA CERVICAL CYTOLOGY 6/2/2020 COLONOSCOPY 10/18/2021 DTaP/Tdap/Td series (2 - Td) 5/8/2024 Allergies as of 1/30/2018  Review Complete On: 1/30/2018 By: Gilbert Potter Severity Noted Reaction Type Reactions Sulfa (Sulfonamide Antibiotics)  03/03/2010    Rash Current Immunizations  Reviewed on 6/21/2016 Name Date Influenza Vaccine 10/1/2015 Pneumococcal Polysaccharide (PPSV-23) 6/16/2015  9:46 AM  
 Td 5/5/2006 Tdap 5/8/2014 Not reviewed this visit Vitals BP Pulse Resp Height(growth percentile) Weight(growth percentile) LMP  
 (!) 140/100 (BP 1 Location: Left arm, BP Patient Position: Sitting) 64 16 5' 5.5\" (1.664 m) 131 lb (59.4 kg) 04/01/2007 BMI OB Status Smoking Status 21.47 kg/m2 Postmenopausal Former Smoker Vitals History BMI and BSA Data Body Mass Index Body Surface Area  
 21.47 kg/m 2 1.66 m 2 Preferred Pharmacy Pharmacy Name Phone [facility] - [place], [facility] Juli Bennett R[facility]. [phone] Your Updated Medication List  
  
   
This list is accurate as of: 1/30/18  8:26 AM.  Always use your most recent med list.  
  
  
  
  
 BC HEADACHE POWDER PO Take  by mouth three (3) times daily as needed. BIOTIN PO Take 500 mcg by mouth daily. CALCIUM CITRATE PO Take 630 mg by mouth two (2) times a day. fiber Cap Take 2 Caps by mouth two (2) times a day. fluticasone 50 mcg/actuation nasal spray Commonly known as:  Angelina Kahlil 2 Sprays by Both Nostrils route daily. losartan 25 mg tablet Commonly known as:  COZAAR Take 1 Tab by mouth daily. RENOVA 0.02 % topical cream  
Generic drug:  tretinoin (emollient) Apply  to affected area nightly. spironolactone 25 mg tablet Commonly known as:  ALDACTONE Take 1 Tab by mouth daily. STOOL SOFTENER PO Take  by mouth. docusate sodium 100 mg capsule Commonly known as:  Teressa Migdalia Take 200 mg by mouth daily. VITAMIN C 500 mg tablet Generic drug:  ascorbic acid (vitamin C) Take 500 mg by mouth two (2) times a day. VITAMIN D3 PO Take 900 Units by mouth two (2) times a day. vitamin E 400 unit capsule Commonly known as:  [address] Take  by mouth daily. Introducing [facility]! Dear Anjel Avila: 
Thank you for requesting a [employer] account. Our records indicate that you already have an active [employer] account. You can access your account anytime at [email or web address] Did you know that you can access your hospital and ER discharge instructions at any time in [employer]? You can also review all of your test results from your hospital stay or ER visit. Additional Information If you have questions, please visit the Frequently Asked Questions section of the [employer] website at [email or web address] Remember, [employer] is NOT to be used for urgent needs. For medical emergencies, dial 911. Now available from your iPhone and Android! Please provide this summary of care documentation to your next provider. Your primary care clinician is listed as Valentino Collins. If you have any questions after today's visit, please call [phone].

## 2018-01-30 NOTE — PROGRESS NOTES
[address]: Bruno Francis  [phone]   best by MD  This note will not be viewable in [address] HPI: Anuel Guillory, a 58y.o. year-old who presents for follow-up on her HTN and bradycardia. Bradycardia has resolved off propanolol  Last visit feeling very well. Now concerned that her bp is creeping up. Reviewed her home log. Discussed available agents. On the losartan, her BP is much better but she has developed indigestion and a bad taste in her rmouth. BP readings reviewed. Scanned. At this time, will cut losartan to 1/2 tablet and move time to bedtime. Think she is sensitive to a filler/dye. If still bothering her, will trial Vasotec at her request.     Denies any chest pain or dyspnea with exertion  No palpitations  Denies headaches or dizziness  No syncope  Exercises daily - walks x 30 minutes to 1 hour   Denies any LE edema  Uses No Salt on her food, washes any canned vegetables that she eats     Assessment/Plan:  1. Bradycardia- rate 60 today, looks fine, resolved off propranolol  2. White coat HTN- higher at home as well, added losartan, will adjust as above.   -hx of migraine headaches on norvasc (she took it for a few weeks)  -hx of dizziness and metallic taste with lisinopril  -hx of bradycardia on propanolol  3. Dyslipidemia- LDLup to 112, discussed diet, lifestyle  4. Abnormal ECG - non-specific ST-T wave changes, asymptomatic  5. IGT A1C 5.8, recently  5.6    Echo EF 73%, mild MR  Holter 43-87 PAC    Soc one glass of wine a day, no tobacco  FHx no early CAD    She  has a past medical history of Colonic polyp (7/05); Cough variant asthma ( ); Degenerative arthritis of lumbar spine (3/11); Insomnia, unspecified ( ); Menstrual migraine ( ); Other and unspecified hyperlipidemia; Rectocele; and Vasovagal syncope (3/24/2016).     Cardiovascular ROS: no chest pain or dyspnea on exertion  Respiratory ROS: no cough, shortness of breath, or wheezing  Neurological ROS: no TIA or stroke symptoms  All other systems negative except as above. PE  Vitals:    01/30/18 0759   BP: (!) 140/100   Pulse: 64   Resp: 16   Weight: 131 lb (59.4 kg)   Height: 5' 5.5\" (1.664 m)    Body mass index is 21.47 kg/(m^2).    General appearance - alert, well appearing, and in no distress  Mental status - affect appropriate to mood  Eyes - sclera anicteric, moist mucous membranes  Neck - supple, no significant adenopathy  Lymphatics - no  lymphadenopathy  Chest - clear to auscultation, no wheezes, rales or rhonchi  Heart - normal rate, regular rhythm, normal S1, S2, no murmurs, rubs, clicks or gallops  Abdomen - soft, nontender, nondistended, no masses or organomegaly  Back exam - full range of motion, no tenderness  Neurological - cranial nerves II through XII grossly intact, no focal deficit  Musculoskeletal - no muscular tenderness noted, normal strength  Extremities - peripheral pulses normal, no pedal edema  Skin - normal coloration  no rashes    12 lead ECG: NSR with non-specific ST-T wave changes     Recent Labs:  Lab Results   Component Value Date/Time    Cholesterol, total 223 06/20/2017 08:10 AM    HDL Cholesterol 97 06/20/2017 08:10 AM    LDL, calculated 112 06/20/2017 08:10 AM    Triglyceride 71 06/20/2017 08:10 AM    CHOL/HDL Ratio 2.0 05/21/2009 09:59 AM     Lab Results   Component Value Date/Time    Creatinine 0.65 01/09/2018 03:52 PM     Lab Results   Component Value Date/Time    BUN 24 01/09/2018 03:52 PM     Lab Results   Component Value Date/Time    Potassium 4.4 01/09/2018 03:52 PM     Lab Results   Component Value Date/Time    Hemoglobin A1c 5.8 06/16/2016 08:02 AM    Hemoglobin A1c, External 5.8 04/15/2016     Lab Results   Component Value Date/Time    HGB 15.4 06/20/2017 08:10 AM     Lab Results   Component Value Date/Time    PLATELET 701 37/33/0462 08:10 AM       Reviewed:  Past Medical History:   Diagnosis Date    Colonic polyp 7/05         Cough variant asthma      Degenerative arthritis of lumbar spine 3/11    Eduardo/os    Insomnia, unspecified      Menstrual migraine      Other and unspecified hyperlipidemia     mild    Rectocele     Vasovagal syncope 3/24/2016     History   Smoking Status    Former Smoker    Packs/day: 1.00    Years: 4.00    Quit date: 1/1/1978   Smokeless Tobacco    Never Used     History   Alcohol Use    2.4 oz/week    4 Glasses of wine, 0 Standard drinks or equivalent per week     Comment: daily     Allergies   Allergen Reactions    Sulfa (Sulfonamide Antibiotics) Rash       Current Outpatient Prescriptions   Medication Sig    docusate sodium (COLACE) 100 mg capsule Take 200 mg by mouth daily.  losartan (COZAAR) 25 mg tablet Take 1 Tab by mouth daily.  CALCIUM CITRATE PO Take 630 mg by mouth two (2) times a day.  spironolactone (ALDACTONE) 25 mg tablet Take 1 Tab by mouth daily.  ASPIRIN/SALICYLAMIDE/CAFFEINE (BC HEADACHE POWDER PO) Take  by mouth three (3) times daily as needed.  CHOLECALCIFEROL, VITAMIN D3, (VITAMIN D3 PO) Take 900 Units by mouth two (2) times a day.  fluticasone (FLONASE) 50 mcg/actuation nasal spray 2 Sprays by Both Nostrils route daily. (Patient taking differently: 2 Sprays by Both Nostrils route daily as needed.)    ascorbic acid (VITAMIN C) 500 mg tablet Take 500 mg by mouth two (2) times a day.  BIOTIN PO Take 500 mcg by mouth daily.  DOCUSATE CALCIUM (STOOL SOFTENER PO) Take  by mouth.  tretinoin, emollient, (RENOVA) 0.02 % topical cream Apply  to affected area nightly.  vitamin E (AQUA GEMS) 400 unit capsule Take  by mouth daily.  fiber Cap Take 2 Caps by mouth two (2) times a day. No current facility-administered medications for this visit.         Diane Wells MD  [facility] [place]  [address] [facility] [address]  [address]

## 2018-02-15 ENCOUNTER — TELEPHONE (OUTPATIENT)
Dept: CARDIOLOGY CLINIC | Age: 63
End: 2018-02-15

## 2018-02-15 NOTE — TELEPHONE ENCOUNTER
Pt would like to give Dr. Sky Magaña an update on her medication. Pt states she wants to try a different medication because she's still experiencing symptoms. Blood pressure has gone up since cutting medication to half a pill. Please give her a call back [phone]. Thanks!   Terra Garcia

## 2018-02-15 NOTE — TELEPHONE ENCOUNTER
LVM for patient to return call at earliest convenience. Patient returned call, 2 pt identifiers used  She states since cutting Losartan to 1/2 tab BP is going back up. BP averaging 141/94. She said the Losartan causes too many side effects and wants to try Vasotec.  Please advise

## 2018-02-16 RX ORDER — ENALAPRIL MALEATE 10 MG/1
TABLET ORAL DAILY
COMMUNITY
End: 2018-02-16 | Stop reason: SDUPTHER

## 2018-02-16 RX ORDER — ENALAPRIL MALEATE 10 MG/1
10 TABLET ORAL DAILY
Qty: 30 TAB | Refills: 6 | Status: SHIPPED | OUTPATIENT
Start: 2018-02-16 | End: 2018-02-19 | Stop reason: DRUGHIGH

## 2018-02-16 NOTE — TELEPHONE ENCOUNTER
Returned patient's call, 2 pt identifiers used  Advised patient a new script has been sent to her local pharmacy for Enalapril 10 mg. Instructed to check BP once a day an hours after morning meds and call in 2 weeks with her results        Requested Prescriptions     Signed Prescriptions Disp Refills    enalapril (VASOTEC) 10 mg tablet 30 Tab 6     Sig: Take 1 Tab by mouth daily.      Authorizing Provider: Jermaine Ojeda     Ordering User: Mandi Caputo     Per orders

## 2018-02-16 NOTE — TELEPHONE ENCOUNTER
Please tell her to stop her losartan and begin enalapril 10mg daily. Please ask her to check her blood pressure daily (at least one hour after her morning blood pressure medications.)  Keep a written record of her blood pressure readings and call us in 2 weeks with all her readings.

## 2018-02-19 ENCOUNTER — TELEPHONE (OUTPATIENT)
Dept: CARDIOLOGY CLINIC | Age: 63
End: 2018-02-19

## 2018-02-19 RX ORDER — ENALAPRIL MALEATE 5 MG/1
TABLET ORAL DAILY
COMMUNITY
End: 2018-03-09 | Stop reason: ALTCHOICE

## 2018-02-19 NOTE — TELEPHONE ENCOUNTER
Returned patient's call, 2 pt identifiers used  The following message given per NP Julia:    Please tell her to reduce her enalapril to 5mg daily  Please ask her to check her blood pressure daily (at least one hour after her medications.)  Keep a written record of blood pressure readings and call in 2 weeks with her readings.

## 2018-02-19 NOTE — TELEPHONE ENCOUNTER
Please tell her to reduce her enalapril to 5mg daily  Please ask her to check her blood pressure daily (at least one hour after her medications.)  Keep a written record of blood pressure readings and call in 2 weeks with her readings.

## 2018-02-19 NOTE — TELEPHONE ENCOUNTER
Pt calling in regards to her new blood pressure medication. She wants to make sure her blood pressure being 103/72 and 111/75 is okay. She said she's only been taking it for a day but she doesn't recall her blood pressure ever being this low. Please give her a call back @ [phone]. Thanks!   Nehemias Hollins

## 2018-03-05 ENCOUNTER — TELEPHONE (OUTPATIENT)
Dept: CARDIOLOGY CLINIC | Age: 63
End: 2018-03-05

## 2018-03-05 DIAGNOSIS — I10 HYPERTENSION, UNSPECIFIED TYPE: Primary | ICD-10-CM

## 2018-03-05 DIAGNOSIS — R94.31 ABNORMAL EKG: ICD-10-CM

## 2018-03-05 NOTE — TELEPHONE ENCOUNTER
Ok, please take the ACE inhibitor off her medication list and advise her to increase her spironolactone to 50mg daily.     Please ask her to have her BMP and magnesium level checked in 1 month after doing this and to send her BP readings to the office in 2 weeks    Future Appointments  Date Time Provider Ludwig Jacobs   5/3/2018 8:20 AM Albina Vang  [address]   6/28/2018 2:00 PM Taqueria Toledo MD [address] [facility]

## 2018-03-05 NOTE — TELEPHONE ENCOUNTER
Called patient. Verified identity. She states that she is going to stop taking the Lisinopril. Since she started taking it she has c/o dizziness, nausea and loss of appetite. She also states she has tried other medications in the past and wants to avoid beta blockers and Calcium channel blockers. Informed her that I will forward this information to [facility] XIN NP in Dr. Philippe grant.

## 2018-03-05 NOTE — TELEPHONE ENCOUNTER
Pt called stating that she would like to discontinue the medication she is on due to unbearable side effects. She asked that Logan Mi give her a call back @ [phone]. Thanks!   [facility]

## 2018-03-06 RX ORDER — SPIRONOLACTONE 50 MG/1
50 TABLET, FILM COATED ORAL DAILY
Qty: 90 TAB | Refills: 3 | Status: SHIPPED | OUTPATIENT
Start: 2018-03-06 | End: 2018-03-13 | Stop reason: DRUGHIGH

## 2018-03-06 NOTE — TELEPHONE ENCOUNTER
Patient returned my call. Verified identity. Informed her of the following medication changes and lab orders per [facility] NP. Also advised patient to check her BP daily and call the office in 2 weeks with readings. Lab slips to be mailed to patient and new script sent to pharmacy as requested. Requested Prescriptions     Signed Prescriptions Disp Refills    spironolactone (ALDACTONE) 50 mg tablet 90 Tab 3     Sig: Take 1 Tab by mouth daily.      Authorizing Provider: Inocencio Bah     Ordering User: Yasmine Menjivar

## 2018-03-09 ENCOUNTER — TELEPHONE (OUTPATIENT)
Dept: CARDIOLOGY CLINIC | Age: 63
End: 2018-03-09

## 2018-03-09 ENCOUNTER — APPOINTMENT (OUTPATIENT)
Dept: CT IMAGING | Age: 63
End: 2018-03-09
Attending: PHYSICIAN ASSISTANT
Payer: COMMERCIAL

## 2018-03-09 ENCOUNTER — APPOINTMENT (OUTPATIENT)
Dept: GENERAL RADIOLOGY | Age: 63
End: 2018-03-09
Attending: EMERGENCY MEDICINE
Payer: COMMERCIAL

## 2018-03-09 ENCOUNTER — HOSPITAL ENCOUNTER (EMERGENCY)
Age: 63
Discharge: HOME | End: 2018-03-09
Attending: EMERGENCY MEDICINE
Payer: COMMERCIAL

## 2018-03-09 ENCOUNTER — OFFICE VISIT (OUTPATIENT)
Dept: FAMILY MEDICINE CLINIC | Age: 63
End: 2018-03-09

## 2018-03-09 VITALS
SYSTOLIC BLOOD PRESSURE: 220 MMHG | WEIGHT: 130 LBS | HEART RATE: 102 BPM | BODY MASS INDEX: 21.66 KG/M2 | TEMPERATURE: 97.6 F | RESPIRATION RATE: 16 BRPM | HEIGHT: 65 IN | OXYGEN SATURATION: 98 % | DIASTOLIC BLOOD PRESSURE: 122 MMHG

## 2018-03-09 VITALS
DIASTOLIC BLOOD PRESSURE: 76 MMHG | SYSTOLIC BLOOD PRESSURE: 126 MMHG | WEIGHT: 127.8 LBS | BODY MASS INDEX: 21.29 KG/M2 | HEART RATE: 62 BPM | OXYGEN SATURATION: 95 % | TEMPERATURE: 98 F | HEIGHT: 65 IN | RESPIRATION RATE: 14 BRPM

## 2018-03-09 DIAGNOSIS — I10 ESSENTIAL HYPERTENSION: Primary | ICD-10-CM

## 2018-03-09 DIAGNOSIS — G44.209 ACUTE NON INTRACTABLE TENSION-TYPE HEADACHE: ICD-10-CM

## 2018-03-09 DIAGNOSIS — I10 HYPERTENSION, UNSPECIFIED TYPE: Primary | ICD-10-CM

## 2018-03-09 LAB
ALBUMIN SERPL-MCNC: 3.9 G/DL (ref 3.5–5)
ALBUMIN/GLOB SERPL: 1.1 {RATIO} (ref 1.1–2.2)
ALP SERPL-CCNC: 66 U/L (ref 45–117)
ALT SERPL-CCNC: 21 U/L (ref 12–78)
ANION GAP SERPL CALC-SCNC: 8 MMOL/L (ref 5–15)
APPEARANCE UR: CLEAR
AST SERPL-CCNC: 20 U/L (ref 15–37)
BACTERIA URNS QL MICRO: NEGATIVE /HPF
BASOPHILS # BLD: 0 K/UL (ref 0–0.1)
BASOPHILS NFR BLD: 1 % (ref 0–1)
BILIRUB SERPL-MCNC: 0.2 MG/DL (ref 0.2–1)
BILIRUB UR QL: NEGATIVE
BNP SERPL-MCNC: 120 PG/ML (ref 0–125)
BUN SERPL-MCNC: 22 MG/DL (ref 6–20)
BUN/CREAT SERPL: 26 (ref 12–20)
CALCIUM SERPL-MCNC: 8.7 MG/DL (ref 8.5–10.1)
CHLORIDE SERPL-SCNC: 106 MMOL/L (ref 97–108)
CO2 SERPL-SCNC: 26 MMOL/L (ref 21–32)
COLOR UR: ABNORMAL
CREAT SERPL-MCNC: 0.86 MG/DL (ref 0.55–1.02)
DIFFERENTIAL METHOD BLD: ABNORMAL
EOSINOPHIL # BLD: 0.2 K/UL (ref 0–0.4)
EOSINOPHIL NFR BLD: 3 % (ref 0–7)
EPITH CASTS URNS QL MICRO: ABNORMAL /LPF
ERYTHROCYTE [DISTWIDTH] IN BLOOD BY AUTOMATED COUNT: 12.3 % (ref 11.5–14.5)
GLOBULIN SER CALC-MCNC: 3.5 G/DL (ref 2–4)
GLUCOSE SERPL-MCNC: 103 MG/DL (ref 65–100)
GLUCOSE UR STRIP.AUTO-MCNC: NEGATIVE MG/DL
HCT VFR BLD AUTO: 45.3 % (ref 35–47)
HGB BLD-MCNC: 15.7 G/DL (ref 11.5–16)
HGB UR QL STRIP: NEGATIVE
HYALINE CASTS URNS QL MICRO: ABNORMAL /LPF (ref 0–5)
IMM GRANULOCYTES # BLD: 0 K/UL (ref 0–0.04)
IMM GRANULOCYTES NFR BLD AUTO: 0 % (ref 0–0.5)
KETONES UR QL STRIP.AUTO: ABNORMAL MG/DL
LEUKOCYTE ESTERASE UR QL STRIP.AUTO: ABNORMAL
LYMPHOCYTES # BLD: 1.9 K/UL (ref 0.8–3.5)
LYMPHOCYTES NFR BLD: 28 % (ref 12–49)
MCH RBC QN AUTO: 32.1 PG (ref 26–34)
MCHC RBC AUTO-ENTMCNC: 34.7 G/DL (ref 30–36.5)
MCV RBC AUTO: 92.6 FL (ref 80–99)
MONOCYTES # BLD: 0.7 K/UL (ref 0–1)
MONOCYTES NFR BLD: 10 % (ref 5–13)
NEUTS SEG # BLD: 3.8 K/UL (ref 1.8–8)
NEUTS SEG NFR BLD: 57 % (ref 32–75)
NITRITE UR QL STRIP.AUTO: NEGATIVE
NRBC # BLD: 0 K/UL (ref 0–0.01)
NRBC BLD-RTO: 0 PER 100 WBC
PH UR STRIP: 6 [PH] (ref 5–8)
PLATELET # BLD AUTO: 284 K/UL (ref 150–400)
PMV BLD AUTO: 8.7 FL (ref 8.9–12.9)
POTASSIUM SERPL-SCNC: 3.5 MMOL/L (ref 3.5–5.1)
PROT SERPL-MCNC: 7.4 G/DL (ref 6.4–8.2)
PROT UR STRIP-MCNC: NEGATIVE MG/DL
RBC # BLD AUTO: 4.89 M/UL (ref 3.8–5.2)
RBC #/AREA URNS HPF: ABNORMAL /HPF (ref 0–5)
SODIUM SERPL-SCNC: 140 MMOL/L (ref 136–145)
SP GR UR REFRACTOMETRY: 1.01 (ref 1–1.03)
TROPONIN I SERPL-MCNC: <0.04 NG/ML
UROBILINOGEN UR QL STRIP.AUTO: 0.2 EU/DL (ref 0.2–1)
WBC # BLD AUTO: 6.7 K/UL (ref 3.6–11)
WBC URNS QL MICRO: ABNORMAL /HPF (ref 0–4)

## 2018-03-09 PROCEDURE — 93005 ELECTROCARDIOGRAM TRACING: CPT

## 2018-03-09 PROCEDURE — 36415 COLL VENOUS BLD VENIPUNCTURE: CPT | Performed by: PHYSICIAN ASSISTANT

## 2018-03-09 PROCEDURE — 74011250637 HC RX REV CODE- 250/637: Performed by: EMERGENCY MEDICINE

## 2018-03-09 PROCEDURE — 70450 CT HEAD/BRAIN W/O DYE: CPT

## 2018-03-09 PROCEDURE — 85025 COMPLETE CBC W/AUTO DIFF WBC: CPT | Performed by: PHYSICIAN ASSISTANT

## 2018-03-09 PROCEDURE — 84484 ASSAY OF TROPONIN QUANT: CPT | Performed by: PHYSICIAN ASSISTANT

## 2018-03-09 PROCEDURE — 81001 URINALYSIS AUTO W/SCOPE: CPT | Performed by: PHYSICIAN ASSISTANT

## 2018-03-09 PROCEDURE — 71046 X-RAY EXAM CHEST 2 VIEWS: CPT

## 2018-03-09 PROCEDURE — 99285 EMERGENCY DEPT VISIT HI MDM: CPT

## 2018-03-09 PROCEDURE — 80053 COMPREHEN METABOLIC PANEL: CPT | Performed by: PHYSICIAN ASSISTANT

## 2018-03-09 PROCEDURE — 83880 ASSAY OF NATRIURETIC PEPTIDE: CPT | Performed by: EMERGENCY MEDICINE

## 2018-03-09 RX ORDER — CLONIDINE HYDROCHLORIDE 0.1 MG/1
0.1 TABLET ORAL
Status: COMPLETED | OUTPATIENT
Start: 2018-03-09 | End: 2018-03-09

## 2018-03-09 RX ORDER — LOSARTAN POTASSIUM 50 MG/1
50 TABLET ORAL
Status: DISCONTINUED | OUTPATIENT
Start: 2018-03-09 | End: 2018-03-09 | Stop reason: HOSPADM

## 2018-03-09 RX ORDER — CLONIDINE HYDROCHLORIDE 0.1 MG/1
0.1 TABLET ORAL 2 TIMES DAILY
Qty: 60 TAB | Refills: 0 | Status: SHIPPED | OUTPATIENT
Start: 2018-03-09 | End: 2018-03-13 | Stop reason: SDUPTHER

## 2018-03-09 RX ADMIN — CLONIDINE HYDROCHLORIDE 0.1 MG: 0.1 TABLET ORAL at 16:59

## 2018-03-09 NOTE — ED PROVIDER NOTES
HPI Comments: 58 y.o. female with past medical history significant for asthma, insomnia, menstrual migraine, colonic polyps, degenerative arthritis of the lumbar spine, hyperlipidemia, rectocele, and vasovagal syncope who presents to the ED with chief complaint of HTN. Patient reports a headache with onset ~2 days ago. Patient reports being seen at the [facility] for her headache \"earlier today,\" reports being told she had high blood pressure and being referred to the ED for further evaluation. Patient reports her headache has subsided since onset. Patient reports feeling stressed and not sleeping enough recently due to being in the process of selling her house. Patient reports a history of migraine headaches related to her menstrual cycles; reports her last migraine headache was ~12 years ago following her menopause. Patient reports having been on Inderal (20 mg) for her migraines in the past per her PCP; reports developing a slow heart rate in the \"high 50s\" and being referred to Dr. Beckey Dancer. Sita Mckeon MD, Cardiology, for it. Patient reports being titrated off of the Inderal and being started on Lisinopril for HTN ~5-6 weeks ago. Patient reports being switched to Vasotec recently due to side effects of metallic taste, loss of appetite, and dizziness with the Lisinopril. Patient reports similar side effects with the Vasotec; reports being taken off of the Vasotec and being told to increase her Spironolactone from 25 mg to 50 mg ~3 days ago by her cardiology nurse. Patient reports currently taking Vitamins regularly. Patient reports a history of lower back pain due to DJD; reports being followed by Dr. Mateus Locke MD for it. Patient reports a history of a tubal ligation ~25-30 years ago. Patient denies tobacco use. Patient reports drinking wine at dinner time. Patient denies chest pain and SOB. There are no other acute medical concerns at this time.     PCP: Breanna Angela MD    Note written by Cris Diaz, as dictated by Ayla Nguyen MD 4:36 PM     The history is provided by the patient. Past Medical History:   Diagnosis Date    Colonic polyp          Cough variant asthma      Degenerative arthritis of lumbar spine 3/11    Eduardo/os    Insomnia, unspecified      Menstrual migraine      Other and unspecified hyperlipidemia     mild    Rectocele     Vasovagal syncope 3/24/2016       Past Surgical History:   Procedure Laterality Date    COLONOSCOPY  ,     [place]/cr    HX GYN  1990s    tubal ligation          Family History:   Problem Relation Age of Onset    Hypertension Mother     Psychiatric Disorder Father      depression/committed suicide   [facility] Depression Father     Hypertension Father     Diabetes Sister     Cancer Sister      pancreatic    Depression Sister        Social History     Social History    Marital status:      Spouse name: N/A    Number of children: N/A    Years of education: N/A     Occupational History    [employer]      Social History Main Topics    Smoking status: Former Smoker     Packs/day: 1.00     Years: 4.00     Quit date: 1978    Smokeless tobacco: Never Used    Alcohol use 2.4 oz/week     4 Glasses of wine, 0 Standard drinks or equivalent per week      Comment: daily    Drug use: No    Sexual activity: Not Currently     Other Topics Concern    Exercise Yes     [employer]     Social History Narrative      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 HENT: Negative for nosebleeds, sore throat, trouble swallowing and voice change. Eyes: Negative for visual disturbance. Respiratory: Negative for shortness of breath. Cardiovascular: Negative for chest pain and palpitations. Gastrointestinal: Negative for abdominal pain, constipation, diarrhea and nausea. Genitourinary: Negative for difficulty urinating, dysuria, hematuria and urgency. Musculoskeletal: Negative for back pain, neck pain and neck stiffness. Skin: Negative for color change. Allergic/Immunologic: Negative for immunocompromised state. Neurological: Positive for headaches. Negative for dizziness, seizures, syncope, weakness, light-headedness and numbness. Psychiatric/Behavioral: Negative for behavioral problems, confusion, hallucinations, self-injury and suicidal ideas. All other systems reviewed and are negative. Vitals:    03/09/18 1610   BP: (!) 200/109   Pulse: 86   Resp: 18   Temp: 97.8 °F (36.6 °C)   SpO2: 98%   Weight: 58 kg (127 lb 12.8 oz)   Height: 5' 5\" (1.651 m)            Physical Exam   Constitutional: She is oriented to person, place, and time. She appears well-developed and well-nourished. No distress. HENT:   Head: Normocephalic and atraumatic. Eyes: Pupils are equal, round, and reactive to light. Neck: Normal range of motion. Neck supple. Cardiovascular: Normal rate, regular rhythm and normal heart sounds. Exam reveals no gallop and no friction rub. No murmur heard. Pulmonary/Chest: Effort normal and breath sounds normal. No respiratory distress. She has no wheezes. Abdominal: Soft. Bowel sounds are normal. She exhibits no distension. There is no tenderness. There is no rebound and no guarding. Musculoskeletal: Normal range of motion. Neurological: She is alert and oriented to person, place, and time. Skin: Skin is warm. No rash noted. She is not diaphoretic. Psychiatric: She has a normal mood and affect. Her behavior is normal. Judgment and thought content normal.   Nursing note and vitals reviewed.   Note written by Samantha Culver, as dictated by Hemal Casey MD 4:50 PM      [facility]      ED Course     This is a 58-year-old female with a past medical history, review of systems, physical exam as above, presenting with complaints of hypertension. Patient states her cardiologist has been addressing her hypertension over the last 6 weeks, states that they've been experimenting with multiple antihypertensive drugs, secondary to side effects, currently only taking Spiriva lactone. She states she discontinued an ACE inhibitor several days ago, developed headache yesterday, and follow with primary care today where she was noted to be hypertensive. She states she is no longer expressing headache, in fact is without symptoms, however is noted to have systolics greater than 631. Physical exam is unremarkable. Plan to send lab work for endorgan dysfunction, chest x-ray, head CT, EKG, and administer additional hypertension for better blood pressure control. Will make a disposition based the patient's diagnostics and response to therapy. Procedures    ED EKG interpretation:  Rhythm: normal sinus rhythm; and regular . Rate (approx.): 71 bpm; Axis: normal; ST/T wave: normal.   Note written by Samantha Ortega, as dictated by Isaiah Walls MD 4:45 PM    6:10 PM  BP check improved, workup unremarkable. Pt remains asymptomatic. Will Dc home with script for clonidine, PCP and Cardiology follow up, return precautions given.

## 2018-03-09 NOTE — PROGRESS NOTES
Chief Complaint   Patient presents with    Headache     Migraine headache day #2 per patient     she is a 58y.o. year old female who presents for evalution. She is here with a migraine headache x 2 days. She is selling her home and has been under a great deal of stress, and also has had some issues with her blood pressure, multiple recent medication changes. She denies chest pain, blurred vision, visual disturbances, fainting or other symptoms. Last migraine was 12 years ago. Reviewed PmHx, RxHx, FmHx, SocHx, AllgHx and updated and dated in the chart. Review of Systems - negative except as listed above in the HPI    Objective:     Vitals:    03/09/18 1531 03/09/18 1536 03/09/18 1603   BP: (!) 167/105 (!) 173/111 (!) 220/122   Pulse: 70 68 (!) 102   Resp: 16     Temp: 97.6 °F (36.4 °C)     TempSrc: Oral     SpO2: 98%     Weight: 130 lb (59 kg)     Height: 5' 5\" (1.651 m)         Current Outpatient Prescriptions   Medication Sig    spironolactone (ALDACTONE) 50 mg tablet Take 1 Tab by mouth daily.  CALCIUM CITRATE PO Take 630 mg by mouth two (2) times a day.  ASPIRIN/SALICYLAMIDE/CAFFEINE (BC HEADACHE POWDER PO) Take  by mouth three (3) times daily as needed.  CHOLECALCIFEROL, VITAMIN D3, (VITAMIN D3 PO) Take 900 Units by mouth two (2) times a day.  fluticasone (FLONASE) 50 mcg/actuation nasal spray 2 Sprays by Both Nostrils route daily. (Patient taking differently: 2 Sprays by Both Nostrils route daily as needed.)    ascorbic acid (VITAMIN C) 500 mg tablet Take 500 mg by mouth two (2) times a day.  BIOTIN PO Take 500 mcg by mouth daily.  DOCUSATE CALCIUM (STOOL SOFTENER PO) Take  by mouth.  tretinoin, emollient, (RENOVA) 0.02 % topical cream Apply  to affected area nightly.  vitamin E (AQUA GEMS) 400 unit capsule Take  by mouth daily.  fiber Cap Take 2 Caps by mouth two (2) times a day. No current facility-administered medications for this visit.         Physical Examination: General appearance - alert, well appearing, and in no distress  Mental status - alert, oriented to person, place, and time  Chest - clear to auscultation, no wheezes, rales or rhonchi, symmetric air entry  Heart - normal rate, regular rhythm, normal S1, S2, no murmurs, rubs, clicks or gallops  Neurological - alert, oriented, normal speech, no focal findings or movement disorder noted      Assessment/ Plan:   Diagnoses and all orders for this visit:    1. Essential hypertension    2. Acute non intractable tension-type headache     I have sent her to the [facility] ED for further evaluation and treatment of hypertensive crisis. She declined ambulance transport and was able to ambulate there independently. Report was called to the ED charge nurse. Follow-up Disposition: Not on File    I have discussed the diagnosis with the patient and the intended plan as seen in the above orders. The patient has received an after-visit summary and questions were answered concerning future plans. Pt conveyed understanding of plan.     Medication Side Effects and Warnings were discussed with patient      Shirley Wilder NP

## 2018-03-09 NOTE — PATIENT INSTRUCTIONS
Tension Headache: Care Instructions  Your Care Instructions  Most headaches are tension headaches. These headaches tend to happen again, especially if you are under stress. A tension headache may cause pain or a feeling of pressure all over your head. You probably can't pinpoint the center of the pain. If you keep getting tension headaches, the best thing you can do to limit them is to find out what is causing them and then make changes in those areas. Follow-up care is a key part of your treatment and safety. Be sure to make and go to all appointments, and call your doctor if you are having problems. It's also a good idea to know your test results and keep a list of the medicines you take. How can you care for yourself at home? · Rest in a quiet, dark room with a cool cloth on your forehead until your headache is gone. Close your eyes, and try to relax or go to sleep. Don't watch TV or read. Avoid using the computer. · Use a warm, moist towel or a heating pad set on low to relax tight shoulder and neck muscles. · Have someone gently massage your neck and shoulders. · Take pain medicines exactly as directed. ¨ If the doctor gave you a prescription medicine for pain, take it as prescribed. ¨ If you are not taking a prescription pain medicine, ask your doctor if you can take an over-the-counter medicine. · Be careful not to take pain medicine more often than the instructions allow, because you may get worse or more frequent headaches when the medicine wears off. · If you get another tension headache, stop what you are doing and sit quietly for a moment. Close your eyes and breathe slowly. Try to relax your head and neck muscles. · Do not ignore new symptoms that occur with a headache, such as fever, weakness or numbness, vision changes, or confusion. These may be signs of a more serious problem. To help prevent headaches  · Keep a headache diary so you can figure out what triggers your headaches. Avoiding triggers may help you prevent headaches. Record when each headache began, how long it lasted, and what the pain was like (throbbing, aching, stabbing, or dull). List anything that may have triggered the headache, such as being physically or emotionally stressed or being anxious or depressed. Other possible triggers are hunger, anger, fatigue, poor posture, and muscle strain. · Find healthy ways to deal with stress. Headaches are most common during or right after stressful times. Take time to relax before and after you do something that has caused a headache in the past.  · Exercise daily to relieve stress. Relaxation exercises may help reduce tension. · Get plenty of sleep. · Eat regularly and well. Long periods without food can trigger a headache. · Treat yourself to a massage. Some people find that massages are very helpful in relieving tension. · Try to keep your muscles relaxed by keeping good posture. Check your jaw, face, neck, and shoulder muscles for tension, and try to relax them. When sitting at a desk, change positions often, and stretch for 30 seconds each hour. · Reduce eyestrain from computers by blinking frequently and looking away from the computer screen every so often. Make sure you have proper eyewear and that your monitor is set up properly, about an arm's length away. When should you call for help? Call 911 anytime you think you may need emergency care. For example, call if:  ? · You have signs of a stroke. These may include:  ¨ Sudden numbness, paralysis, or weakness in your face, arm, or leg, especially on only one side of your body. ¨ Sudden vision changes. ¨ Sudden trouble speaking. ¨ Sudden confusion or trouble understanding simple statements. ¨ Sudden problems with walking or balance. ¨ A sudden, severe headache that is different from past headaches. ?Call your doctor now or seek immediate medical care if:  ? · You have new or worse nausea and vomiting.    ? · You have a new or higher fever. ? · Your headache gets much worse. ? Watch closely for changes in your health, and be sure to contact your doctor if:  ? · You are not getting better after 2 days (48 hours). Where can you learn more? Go to http://edward-enrique.info/. Enter 84 17 02 in the search box to learn more about \"Tension Headache: Care Instructions. \"  Current as of: October 14, 2016  Content Version: 11.4  © 5511-7399 [employer] [place]. Care instructions adapted under license by [employer] (which disclaims liability or warranty for this information). If you have questions about a medical condition or this instruction, always ask your healthcare professional. [address] any warranty or liability for your use of this information.

## 2018-03-09 NOTE — ED TRIAGE NOTES
Patient was being seen at the [facility] for a headache. Patient was found to be hypertensive and referred here. Patients cardiologist is Dr. Maricel Caldera and she has been changing patients blood pressure medications.

## 2018-03-09 NOTE — DISCHARGE INSTRUCTIONS
Clonidine (By mouth)   Clonidine ([id number])  Treats high blood pressure. Also treats ADHD. Brand Name(s): Catapres, Kapvay   There may be other brand names for this medicine. When This Medicine Should Not Be Used: This medicine is not right for everyone. Do not use it if you had an allergic reaction to clonidine. How to Use This Medicine:   Long Acting Suspension, Tablet, Long Acting Tablet  · Take your medicine as directed. Your dose may need to be changed several times to find what works best for you. · Swallow the extended-release tablet whole. Do not crush, break, or chew it. · Clonidine extended-release tablets work differently than clonidine immediate-release tablets. Do not switch from the extended-release tablets to the immediate-release tablets unless your doctor tells you to. · Measure the oral liquid medicine with a marked measuring spoon, oral syringe, or medicine cup. Shake the bottle well before each use. · Read and follow the patient instructions that come with this medicine. Talk to your doctor or pharmacist if you have any questions. · Missed dose: Take a dose as soon as you remember. If it is almost time for your next dose, wait until then and take a regular dose. Do not take extra medicine to make up for a missed dose. If you miss more than 1 dose of clonidine tablets, check with your doctor right away. · Store the medicine in a closed container at room temperature, away from heat, moisture, and direct light. Drugs and Foods to Avoid:   Ask your doctor or pharmacist before using any other medicine, including over-the-counter medicines, vitamins, and herbal products. · Do not use this medicine together with other products that contain clonidine. · Some medicines can affect how clonidine works. Tell your doctor if you are taking depression medicine. · Tell your doctor if you use anything else that makes you sleepy.  Some examples are allergy medicine, narcotic pain medicine, and alcohol. Warnings While Using This Medicine:   · Tell your doctor if you are pregnant, breastfeeding, or have kidney disease, heart or blood vessel disease, heart rhythm problems, stomach or bowel problems, or a history of heart attack or stroke. · This medicine may make you drowsy, dizzy, or lightheaded. Do not drive or do anything that could be dangerous until you know how this medicine affects you. · This medicine may cause dryness of the eyes. Talk to your doctor about how to treat the dryness. · Do not stop using this medicine suddenly. Your doctor will need to slowly decrease your dose before you stop it completely. · Tell any doctor or dentist who treats you that you are using this medicine. You may need to stop using this medicine several days before you have surgery or medical tests. · Even if you feel well, do not stop using the medicine without asking your doctor first. This medicine will not cure your high blood pressure, but it will help keep it in the normal range. You may have to take blood pressure medicine for the rest of your life. · Your doctor will check your progress and the effects of this medicine at regular visits. Keep all appointments. · Keep all medicine out of the reach of children. Never share your medicine with anyone.   Possible Side Effects While Using This Medicine:   Call your doctor right away if you notice any of these side effects:  · Allergic reaction: Itching or hives, swelling in your face or hands, swelling or tingling in your mouth or throat, chest tightness, trouble breathing  · Fast, slow, pounding, or uneven heartbeat  · Lightheadedness, dizziness, fainting  · Swelling in your hands, ankles, or feet  · Unusual tiredness or weakness  If you notice these less serious side effects, talk with your doctor:   · Constipation, stomach pain  · Dry eyes, mouth, or throat  · Mild skin rash  · New or worsening headache  If you notice other side effects that you think are caused by this medicine, tell your doctor. Call your doctor for medical advice about side effects. You may report side effects to FDA at 6-515-SJO-8375  © 2017 [facility] Information is for End User's use only and may not be sold, redistributed or otherwise used for commercial purposes. The above information is an  only. It is not intended as medical advice for individual conditions or treatments. Talk to your doctor, nurse or pharmacist before following any medical regimen to see if it is safe and effective for you. High Blood Pressure: Care Instructions  Your Care Instructions    If your blood pressure is usually above 140/90, you have high blood pressure, or hypertension. That means the top number is 140 or higher or the bottom number is 90 or higher, or both. Despite what a lot of people think, high blood pressure usually doesn't cause headaches or make you feel dizzy or lightheaded. It usually has no symptoms. But it does increase your risk for heart attack, stroke, and kidney or eye damage. The higher your blood pressure, the more your risk increases. Your doctor will give you a goal for your blood pressure. Your goal will be based on your health and your age. An example of a goal is to keep your blood pressure below 140/90. Lifestyle changes, such as eating healthy and being active, are always important to help lower blood pressure. You might also take medicine to reach your blood pressure goal.  Follow-up care is a key part of your treatment and safety. Be sure to make and go to all appointments, and call your doctor if you are having problems. It's also a good idea to know your test results and keep a list of the medicines you take. How can you care for yourself at home? Medical treatment  · If you stop taking your medicine, your blood pressure will go back up. You may take one or more types of medicine to lower your blood pressure. Be safe with medicines. Take your medicine exactly as prescribed. Call your doctor if you think you are having a problem with your medicine. · Talk to your doctor before you start taking aspirin every day. Aspirin can help certain people lower their risk of a heart attack or stroke. But taking aspirin isn't right for everyone, because it can cause serious bleeding. · See your doctor regularly. You may need to see the doctor more often at first or until your blood pressure comes down. · If you are taking blood pressure medicine, talk to your doctor before you take decongestants or anti-inflammatory medicine, such as ibuprofen. Some of these medicines can raise blood pressure. · Learn how to check your blood pressure at home. Lifestyle changes  · Stay at a healthy weight. This is especially important if you put on weight around the waist. Losing even 10 pounds can help you lower your blood pressure. · If your doctor recommends it, get more exercise. Walking is a good choice. Bit by bit, increase the amount you walk every day. Try for at least 30 minutes on most days of the week. You also may want to swim, bike, or do other activities. · Avoid or limit alcohol. Talk to your doctor about whether you can drink any alcohol. · Try to limit how much sodium you eat to less than 2,300 milligrams (mg) a day. Your doctor may ask you to try to eat less than 1,500 mg a day. · Eat plenty of fruits (such as bananas and oranges), vegetables, legumes, whole grains, and low-fat dairy products. · Lower the amount of saturated fat in your diet. Saturated fat is found in animal products such as milk, cheese, and meat. Limiting these foods may help you lose weight and also lower your risk for heart disease. · Do not smoke. Smoking increases your risk for heart attack and stroke. If you need help quitting, talk to your doctor about stop-smoking programs and medicines. These can increase your chances of quitting for good.   When should you call for help?  Call 911 anytime you think you may need emergency care. This may mean having symptoms that suggest that your blood pressure is causing a serious heart or blood vessel problem. Your blood pressure may be over 180/110. ? For example, call 911 if:  ? · You have symptoms of a heart attack. These may include:  ¨ Chest pain or pressure, or a strange feeling in the chest.  ¨ Sweating. ¨ Shortness of breath. ¨ Nausea or vomiting. ¨ Pain, pressure, or a strange feeling in the back, neck, jaw, or upper belly or in one or both shoulders or arms. ¨ Lightheadedness or sudden weakness. ¨ A fast or irregular heartbeat. ? · You have symptoms of a stroke. These may include:  ¨ Sudden numbness, tingling, weakness, or loss of movement in your face, arm, or leg, especially on only one side of your body. ¨ Sudden vision changes. ¨ Sudden trouble speaking. ¨ Sudden confusion or trouble understanding simple statements. ¨ Sudden problems with walking or balance. ¨ A sudden, severe headache that is different from past headaches. ? · You have severe back or belly pain. ?Do not wait until your blood pressure comes down on its own. Get help right away. ?Call your doctor now or seek immediate care if:  ? · Your blood pressure is much higher than normal (such as 180/110 or higher), but you don't have symptoms. ? · You think high blood pressure is causing symptoms, such as:  ¨ Severe headache. ¨ Blurry vision. ? Watch closely for changes in your health, and be sure to contact your doctor if:  ? · Your blood pressure measures 140/90 or higher at least 2 times. That means the top number is 140 or higher or the bottom number is 90 or higher, or both. ? · You think you may be having side effects from your blood pressure medicine. ? · Your blood pressure is usually normal, but it goes above normal at least 2 times. Where can you learn more? Go to http://edward-enrique.info/.   Enter O996 in the search box to learn more about \"High Blood Pressure: Care Instructions. \"  Current as of: September 21, 2016  Content Version: 11.4  © 3521-8165 [employer]. Care instructions adapted under license by [facility] (which disclaims liability or warranty for this information). If you have questions about a medical condition or this instruction, always ask your healthcare professional. [place] [address] any warranty or liability for your use of this information.

## 2018-03-09 NOTE — TELEPHONE ENCOUNTER
Returned call to patient. Verified identity. Clarified medication changes with patient. Patient acknowledged understanding.

## 2018-03-09 NOTE — ROUTINE PROCESS
Reviewed discharge instructions with the patient who verbalized understanding and denied having any further questions. Script given.

## 2018-03-09 NOTE — TELEPHONE ENCOUNTER
Pt calling to discuss the change in medication that started 3/6. Please give pt a call back @ [phone]. Thanks!   Al Rose

## 2018-03-10 LAB
ATRIAL RATE: 71 BPM
CALCULATED P AXIS, ECG09: 64 DEGREES
CALCULATED R AXIS, ECG10: 0 DEGREES
CALCULATED T AXIS, ECG11: 54 DEGREES
DIAGNOSIS, 93000: NORMAL
P-R INTERVAL, ECG05: 148 MS
Q-T INTERVAL, ECG07: 404 MS
QRS DURATION, ECG06: 88 MS
QTC CALCULATION (BEZET), ECG08: 439 MS
VENTRICULAR RATE, ECG03: 71 BPM

## 2018-03-12 ENCOUNTER — TELEPHONE (OUTPATIENT)
Dept: CARDIOLOGY CLINIC | Age: 63
End: 2018-03-12

## 2018-03-12 NOTE — TELEPHONE ENCOUNTER
Returned call to patient. Left another message confirming upcoming appointment date and time. Advised her to call if that time does not work for her.

## 2018-03-12 NOTE — TELEPHONE ENCOUNTER
Patient called stating she was seen in ER for HTN over the weekend. She was instructedree to F/u with Dr. Armand Tracy as soon as possible.  Please call her at [phone]    ER visit in [facility]

## 2018-03-12 NOTE — TELEPHONE ENCOUNTER
Called patient, left a message to return my call.  Scheduled patient to see Elizabeth Morrissey 3/19/18 at 2:20 pm.

## 2018-03-12 NOTE — TELEPHONE ENCOUNTER
Patient returned my call. Verified identity.  Informed her that I scheduled her to see Iron Valdes 3/19/18 at 2:20 pm.

## 2018-03-13 ENCOUNTER — OFFICE VISIT (OUTPATIENT)
Dept: INTERNAL MEDICINE CLINIC | Age: 63
End: 2018-03-13

## 2018-03-13 VITALS
BODY MASS INDEX: 21.66 KG/M2 | HEIGHT: 65 IN | RESPIRATION RATE: 16 BRPM | WEIGHT: 130 LBS | OXYGEN SATURATION: 98 % | TEMPERATURE: 98.5 F | HEART RATE: 85 BPM | SYSTOLIC BLOOD PRESSURE: 144 MMHG | DIASTOLIC BLOOD PRESSURE: 90 MMHG

## 2018-03-13 DIAGNOSIS — I10 WHITE COAT SYNDROME WITH DIAGNOSIS OF HYPERTENSION: Primary | ICD-10-CM

## 2018-03-13 DIAGNOSIS — R82.998 URINE WHITE BLOOD CELLS INCREASED: ICD-10-CM

## 2018-03-13 LAB
BILIRUB UR QL STRIP: NEGATIVE
GLUCOSE UR-MCNC: NEGATIVE MG/DL
KETONES P FAST UR STRIP-MCNC: NEGATIVE MG/DL
PH UR STRIP: 5 [PH] (ref 4.6–8)
PROT UR QL STRIP: NEGATIVE
SP GR UR STRIP: 1 (ref 1–1.03)
UA UROBILINOGEN AMB POC: NORMAL (ref 0.2–1)
URINALYSIS CLARITY POC: CLEAR
URINALYSIS COLOR POC: YELLOW
URINE BLOOD POC: NORMAL
URINE LEUKOCYTES POC: NORMAL
URINE NITRITES POC: NEGATIVE

## 2018-03-13 RX ORDER — CLONIDINE HYDROCHLORIDE 0.1 MG/1
0.1 TABLET ORAL 2 TIMES DAILY
Qty: 180 TAB | Refills: 0 | Status: SHIPPED | OUTPATIENT
Start: 2018-03-13 | End: 2018-05-03 | Stop reason: SDUPTHER

## 2018-03-13 RX ORDER — SPIRONOLACTONE 25 MG/1
25 TABLET ORAL DAILY
Qty: 90 TAB | Refills: 0 | Status: SHIPPED | OUTPATIENT
Start: 2018-03-13

## 2018-03-13 NOTE — PROGRESS NOTES
HPI:  Bryn Reed is a 58y.o. year old female who returns to clinic today for follow up: For recent ER visit for headache and elevated blood pressure. She states She has been seeing Dr Ray Oliva and was started on losartan 12/20/17 for elevated blood pressure and not able to tolerated due to nausea and decreased appetite. She was changed 1/31/18 to lower dose of losartan and did not help her side effects and losartan was stopped. 2/18 she was started on vasotec 10mg and developed low BP and dose was decreased to 5 mg. On 3/6/18 developed nausea and decreased appetite and bad taste in her mouth which she felt was related to the Vasotec. The Vasotec was stopped and her spironolactone was increased to 50mg and the next day developed bad headache. On 3/9/18 she was seen at [facility] clinic for her headache and BP was high and she was sent to the ER and was started on clonidine. She decreased her spironolactone to 25 mg daily ER tests all normal. Notes appetite improved and nausea resolved. She feels she may be drowsy on the clonidine but is very happy with her. -130/80 at home. She does also have history of white coat HTN with significant elevations in her blood pressure when she is seen in the office. Current Outpatient Prescriptions   Medication Sig Dispense Refill    cloNIDine HCl (CATAPRES) 0.1 mg tablet Take 1 Tab by mouth two (2) times a day for 30 days. 60 Tab 0    spironolactone (ALDACTONE) 50 mg tablet Take 1 Tab by mouth daily. 90 Tab 3    CALCIUM CITRATE PO Take 630 mg by mouth two (2) times a day.  ASPIRIN/SALICYLAMIDE/CAFFEINE (BC HEADACHE POWDER PO) Take  by mouth three (3) times daily as needed.  CHOLECALCIFEROL, VITAMIN D3, (VITAMIN D3 PO) Take 900 Units by mouth two (2) times a day.  ascorbic acid (VITAMIN C) 500 mg tablet Take 500 mg by mouth two (2) times a day.  BIOTIN PO Take 500 mcg by mouth daily.  DOCUSATE CALCIUM (STOOL SOFTENER PO) Take  by mouth.  vitamin E (AQUA GEMS) 400 unit capsule Take  by mouth daily.  fiber Cap Take 2 Caps by mouth two (2) times a day.  fluticasone (FLONASE) 50 mcg/actuation nasal spray 2 Sprays by Both Nostrils route daily. (Patient taking differently: 2 Sprays by Both Nostrils route daily as needed.) 1 Bottle 5    tretinoin, emollient, (RENOVA) 0.02 % topical cream Apply  to affected area nightly. Allergies   Allergen Reactions    Sulfa (Sulfonamide Antibiotics) Rash     Social History   Substance Use Topics    Smoking status: Former Smoker     Packs/day: 1.00     Years: 4.00     Quit date: 1/1/1978    Smokeless tobacco: Never Used    Alcohol use 2.4 oz/week     4 Glasses of wine, 0 Standard drinks or equivalent per week      Comment: daily         Review of Systems   Constitutional: Negative for malaise/fatigue. Respiratory: Negative for shortness of breath. Cardiovascular: Negative for chest pain and leg swelling. Gastrointestinal: Negative for abdominal pain and heartburn. Neurological: Negative for dizziness and headaches. Physical Exam   Constitutional: She appears well-developed. No distress. /90  Pulse 85  Temp 98.5 °F (36.9 °C) (Oral)   Resp 16  Ht 5' 5\" (1.651 m)  Wt 130 lb (59 kg)  LMP 04/01/2007  SpO2 98%  BMI 21.63 kg/m2   Neck: Carotid bruit is not present. Cardiovascular: Normal rate, regular rhythm, normal heart sounds and intact distal pulses. No murmur heard. Pulmonary/Chest: Effort normal and breath sounds normal. She has no wheezes. Abdominal: Soft. Bowel sounds are normal. She exhibits no distension and no mass. There is no tenderness. Musculoskeletal: She exhibits no edema. Psychiatric: She has a normal mood and affect. Vitals reviewed. Assessment & Plan:    ICD-10-CM ICD-9-CM    1.  White coat syndrome with diagnosis of hypertension  Blood pressure at home is controlled on clonidine and spironolactone I do believe there was a significant white coat elevation in her blood pressure going on when she was in the emergency room. Blood pressure initially significantly elevated at office visit today. It does decrease to borderline elevation. I believe this is all white coat elevation. I do believe she does have some hypertension and that that is currently controlled with her clonidine and Spironolactone. She will decrease her spironolactone to the 25 mg dose. Headaches have resolved. Will monitor home blood pressures closely and she will follow-up in 1 month. I10 401.9 UA/M W/RFLX CULTURE, ROUTINE   2. Urine white blood cells increased R82.99 791.7 AMB POC URINALYSIS DIP STICK AUTO W/O MICRO      UA/M W/RFLX CULTURE, ROUTINE        Follow-up Disposition:  Return in about 4 weeks (around 4/10/2018) for follow up. Advised her to call back or return to office if symptoms worsen/change/persist.  Discussed expected course/resolution/complications of diagnosis in detail with patient. Medication risks/benefits/costs/interactions/alternatives discussed with patient. She was given an after visit summary which includes diagnoses, current medications, & vitals. She expressed understanding with the diagnosis and plan.

## 2018-03-15 LAB
APPEARANCE UR: CLEAR
BACTERIA #/AREA URNS HPF: NORMAL /[HPF]
BACTERIA UR CULT: NORMAL
BILIRUB UR QL STRIP: NEGATIVE
CASTS URNS QL MICRO: NORMAL /LPF
COLOR UR: YELLOW
EPI CELLS #/AREA URNS HPF: NORMAL /HPF
GLUCOSE UR QL: NEGATIVE
HGB UR QL STRIP: NEGATIVE
KETONES UR QL STRIP: NEGATIVE
LEUKOCYTE ESTERASE UR QL STRIP: ABNORMAL
MICRO URNS: ABNORMAL
NITRITE UR QL STRIP: NEGATIVE
PH UR STRIP: 5.5 [PH] (ref 5–7.5)
PROT UR QL STRIP: NEGATIVE
RBC #/AREA URNS HPF: NORMAL /HPF
SP GR UR: 1.01 (ref 1–1.03)
URINALYSIS REFLEX, 377202: ABNORMAL
UROBILINOGEN UR STRIP-MCNC: 0.2 MG/DL (ref 0.2–1)
WBC #/AREA URNS HPF: NORMAL /HPF

## 2018-04-02 ENCOUNTER — APPOINTMENT (OUTPATIENT)
Dept: CT IMAGING | Age: 63
End: 2018-04-02
Attending: EMERGENCY MEDICINE
Payer: COMMERCIAL

## 2018-04-02 ENCOUNTER — APPOINTMENT (OUTPATIENT)
Dept: GENERAL RADIOLOGY | Age: 63
End: 2018-04-02
Attending: EMERGENCY MEDICINE
Payer: COMMERCIAL

## 2018-04-02 ENCOUNTER — HOSPITAL ENCOUNTER (OUTPATIENT)
Age: 63
Setting detail: OBSERVATION
Discharge: HOME | End: 2018-04-03
Attending: EMERGENCY MEDICINE | Admitting: INTERNAL MEDICINE
Payer: COMMERCIAL

## 2018-04-02 DIAGNOSIS — R53.1 LEFT-SIDED WEAKNESS: Primary | ICD-10-CM

## 2018-04-02 DIAGNOSIS — I10 WHITE COAT SYNDROME WITH DIAGNOSIS OF HYPERTENSION: ICD-10-CM

## 2018-04-02 DIAGNOSIS — I67.4 HYPERTENSIVE ENCEPHALOPATHY: ICD-10-CM

## 2018-04-02 PROBLEM — G45.9 TIA (TRANSIENT ISCHEMIC ATTACK): Status: ACTIVE | Noted: 2018-04-02

## 2018-04-02 LAB
ALBUMIN SERPL-MCNC: 3.8 G/DL (ref 3.5–5)
ALBUMIN/GLOB SERPL: 1.4 {RATIO} (ref 1.1–2.2)
ALP SERPL-CCNC: 58 U/L (ref 45–117)
ALT SERPL-CCNC: 18 U/L (ref 12–78)
ANION GAP SERPL CALC-SCNC: 11 MMOL/L (ref 5–15)
APPEARANCE UR: CLEAR
AST SERPL-CCNC: 19 U/L (ref 15–37)
ATRIAL RATE: 63 BPM
BACTERIA URNS QL MICRO: NEGATIVE /HPF
BASOPHILS # BLD: 0 K/UL (ref 0–0.1)
BASOPHILS NFR BLD: 0 % (ref 0–1)
BILIRUB SERPL-MCNC: 0.4 MG/DL (ref 0.2–1)
BILIRUB UR QL: NEGATIVE
BNP SERPL-MCNC: 124 PG/ML (ref 0–125)
BUN SERPL-MCNC: 18 MG/DL (ref 6–20)
BUN/CREAT SERPL: 24 (ref 12–20)
CALCIUM SERPL-MCNC: 9 MG/DL (ref 8.5–10.1)
CALCULATED P AXIS, ECG09: 59 DEGREES
CALCULATED R AXIS, ECG10: -8 DEGREES
CALCULATED T AXIS, ECG11: 29 DEGREES
CHLORIDE SERPL-SCNC: 101 MMOL/L (ref 97–108)
CK SERPL-CCNC: 92 U/L (ref 26–192)
CO2 SERPL-SCNC: 25 MMOL/L (ref 21–32)
COLOR UR: ABNORMAL
CREAT SERPL-MCNC: 0.76 MG/DL (ref 0.55–1.02)
DIAGNOSIS, 93000: NORMAL
DIFFERENTIAL METHOD BLD: ABNORMAL
EOSINOPHIL # BLD: 0 K/UL (ref 0–0.4)
EOSINOPHIL NFR BLD: 0 % (ref 0–7)
EPITH CASTS URNS QL MICRO: ABNORMAL /LPF
ERYTHROCYTE [DISTWIDTH] IN BLOOD BY AUTOMATED COUNT: 12.3 % (ref 11.5–14.5)
GLOBULIN SER CALC-MCNC: 2.8 G/DL (ref 2–4)
GLUCOSE BLD STRIP.AUTO-MCNC: 133 MG/DL (ref 65–100)
GLUCOSE SERPL-MCNC: 141 MG/DL (ref 65–100)
GLUCOSE UR STRIP.AUTO-MCNC: NEGATIVE MG/DL
HCT VFR BLD AUTO: 41.7 % (ref 35–47)
HGB BLD-MCNC: 14.3 G/DL (ref 11.5–16)
HGB UR QL STRIP: NEGATIVE
IMM GRANULOCYTES # BLD: 0 K/UL (ref 0–0.04)
IMM GRANULOCYTES NFR BLD AUTO: 0 % (ref 0–0.5)
INR BLD: 1 (ref 0.9–1.2)
INR PPP: 1 (ref 0.9–1.1)
KETONES UR QL STRIP.AUTO: NEGATIVE MG/DL
LEUKOCYTE ESTERASE UR QL STRIP.AUTO: ABNORMAL
LYMPHOCYTES # BLD: 0.9 K/UL (ref 0.8–3.5)
LYMPHOCYTES NFR BLD: 13 % (ref 12–49)
MAGNESIUM SERPL-MCNC: 2.2 MG/DL (ref 1.6–2.4)
MCH RBC QN AUTO: 32.4 PG (ref 26–34)
MCHC RBC AUTO-ENTMCNC: 34.3 G/DL (ref 30–36.5)
MCV RBC AUTO: 94.6 FL (ref 80–99)
MONOCYTES # BLD: 0.5 K/UL (ref 0–1)
MONOCYTES NFR BLD: 7 % (ref 5–13)
NEUTS SEG # BLD: 5.4 K/UL (ref 1.8–8)
NEUTS SEG NFR BLD: 79 % (ref 32–75)
NITRITE UR QL STRIP.AUTO: NEGATIVE
NRBC # BLD: 0 K/UL (ref 0–0.01)
NRBC BLD-RTO: 0 PER 100 WBC
P-R INTERVAL, ECG05: 160 MS
PH UR STRIP: 7 [PH] (ref 5–8)
PLATELET # BLD AUTO: 282 K/UL (ref 150–400)
PMV BLD AUTO: 8.5 FL (ref 8.9–12.9)
POTASSIUM SERPL-SCNC: 3.3 MMOL/L (ref 3.5–5.1)
PROT SERPL-MCNC: 6.6 G/DL (ref 6.4–8.2)
PROT UR STRIP-MCNC: NEGATIVE MG/DL
PROTHROMBIN TIME: 10.6 SEC (ref 9–11.1)
Q-T INTERVAL, ECG07: 412 MS
QRS DURATION, ECG06: 90 MS
QTC CALCULATION (BEZET), ECG08: 421 MS
RBC # BLD AUTO: 4.41 M/UL (ref 3.8–5.2)
RBC #/AREA URNS HPF: ABNORMAL /HPF (ref 0–5)
SERVICE CMNT-IMP: ABNORMAL
SODIUM SERPL-SCNC: 137 MMOL/L (ref 136–145)
SP GR UR REFRACTOMETRY: 1.01 (ref 1–1.03)
TROPONIN I SERPL-MCNC: <0.04 NG/ML
UR CULT HOLD, URHOLD: NORMAL
UROBILINOGEN UR QL STRIP.AUTO: 0.2 EU/DL (ref 0.2–1)
VENTRICULAR RATE, ECG03: 63 BPM
WBC # BLD AUTO: 6.8 K/UL (ref 3.6–11)
WBC URNS QL MICRO: ABNORMAL /HPF (ref 0–4)

## 2018-04-02 PROCEDURE — 83735 ASSAY OF MAGNESIUM: CPT | Performed by: EMERGENCY MEDICINE

## 2018-04-02 PROCEDURE — 36415 COLL VENOUS BLD VENIPUNCTURE: CPT | Performed by: EMERGENCY MEDICINE

## 2018-04-02 PROCEDURE — 96360 HYDRATION IV INFUSION INIT: CPT

## 2018-04-02 PROCEDURE — 85025 COMPLETE CBC W/AUTO DIFF WBC: CPT | Performed by: EMERGENCY MEDICINE

## 2018-04-02 PROCEDURE — 93005 ELECTROCARDIOGRAM TRACING: CPT

## 2018-04-02 PROCEDURE — 84484 ASSAY OF TROPONIN QUANT: CPT | Performed by: EMERGENCY MEDICINE

## 2018-04-02 PROCEDURE — 74011636320 HC RX REV CODE- 636/320: Performed by: EMERGENCY MEDICINE

## 2018-04-02 PROCEDURE — 74011250637 HC RX REV CODE- 250/637: Performed by: INTERNAL MEDICINE

## 2018-04-02 PROCEDURE — 99218 HC RM OBSERVATION: CPT

## 2018-04-02 PROCEDURE — 74011250636 HC RX REV CODE- 250/636: Performed by: EMERGENCY MEDICINE

## 2018-04-02 PROCEDURE — 82550 ASSAY OF CK (CPK): CPT | Performed by: EMERGENCY MEDICINE

## 2018-04-02 PROCEDURE — 81001 URINALYSIS AUTO W/SCOPE: CPT | Performed by: EMERGENCY MEDICINE

## 2018-04-02 PROCEDURE — 99285 EMERGENCY DEPT VISIT HI MDM: CPT

## 2018-04-02 PROCEDURE — 85610 PROTHROMBIN TIME: CPT

## 2018-04-02 PROCEDURE — 80053 COMPREHEN METABOLIC PANEL: CPT | Performed by: EMERGENCY MEDICINE

## 2018-04-02 PROCEDURE — 83880 ASSAY OF NATRIURETIC PEPTIDE: CPT | Performed by: EMERGENCY MEDICINE

## 2018-04-02 PROCEDURE — 85610 PROTHROMBIN TIME: CPT | Performed by: EMERGENCY MEDICINE

## 2018-04-02 PROCEDURE — 70496 CT ANGIOGRAPHY HEAD: CPT

## 2018-04-02 PROCEDURE — 96361 HYDRATE IV INFUSION ADD-ON: CPT

## 2018-04-02 PROCEDURE — 74011000258 HC RX REV CODE- 258: Performed by: EMERGENCY MEDICINE

## 2018-04-02 PROCEDURE — 71045 X-RAY EXAM CHEST 1 VIEW: CPT

## 2018-04-02 PROCEDURE — 70450 CT HEAD/BRAIN W/O DYE: CPT

## 2018-04-02 PROCEDURE — 83036 HEMOGLOBIN GLYCOSYLATED A1C: CPT | Performed by: INTERNAL MEDICINE

## 2018-04-02 PROCEDURE — 93306 TTE W/DOPPLER COMPLETE: CPT

## 2018-04-02 PROCEDURE — 82962 GLUCOSE BLOOD TEST: CPT

## 2018-04-02 RX ORDER — SPIRONOLACTONE 25 MG/1
25 TABLET ORAL DAILY
Status: DISCONTINUED | OUTPATIENT
Start: 2018-04-03 | End: 2018-04-03 | Stop reason: HOSPADM

## 2018-04-02 RX ORDER — SODIUM CHLORIDE 0.9 % (FLUSH) 0.9 %
10 SYRINGE (ML) INJECTION
Status: COMPLETED | OUTPATIENT
Start: 2018-04-02 | End: 2018-04-02

## 2018-04-02 RX ORDER — LORAZEPAM 0.5 MG/1
0.5 TABLET ORAL
Status: DISCONTINUED | OUTPATIENT
Start: 2018-04-02 | End: 2018-04-03 | Stop reason: HOSPADM

## 2018-04-02 RX ORDER — ACETAMINOPHEN 650 MG/1
650 SUPPOSITORY RECTAL
Status: DISCONTINUED | OUTPATIENT
Start: 2018-04-02 | End: 2018-04-03 | Stop reason: HOSPADM

## 2018-04-02 RX ORDER — GUAIFENESIN 100 MG/5ML
81 LIQUID (ML) ORAL DAILY
Status: DISCONTINUED | OUTPATIENT
Start: 2018-04-03 | End: 2018-04-03 | Stop reason: HOSPADM

## 2018-04-02 RX ORDER — POTASSIUM CHLORIDE 20MEQ/15ML
20 LIQUID (ML) ORAL ONCE
Status: COMPLETED | OUTPATIENT
Start: 2018-04-02 | End: 2018-04-02

## 2018-04-02 RX ORDER — SODIUM CHLORIDE 0.9 % (FLUSH) 0.9 %
5-10 SYRINGE (ML) INJECTION EVERY 8 HOURS
Status: DISCONTINUED | OUTPATIENT
Start: 2018-04-02 | End: 2018-04-03 | Stop reason: HOSPADM

## 2018-04-02 RX ORDER — CLONIDINE HYDROCHLORIDE 0.1 MG/1
0.1 TABLET ORAL 2 TIMES DAILY
Status: DISCONTINUED | OUTPATIENT
Start: 2018-04-02 | End: 2018-04-02

## 2018-04-02 RX ORDER — ACETAMINOPHEN 325 MG/1
650 TABLET ORAL
Status: DISCONTINUED | OUTPATIENT
Start: 2018-04-02 | End: 2018-04-03 | Stop reason: HOSPADM

## 2018-04-02 RX ORDER — ASCORBIC ACID 500 MG
500 TABLET ORAL 2 TIMES DAILY
Status: DISCONTINUED | OUTPATIENT
Start: 2018-04-02 | End: 2018-04-03 | Stop reason: HOSPADM

## 2018-04-02 RX ORDER — AZITHROMYCIN 250 MG/1
500 TABLET, FILM COATED ORAL SEE ADMIN INSTRUCTIONS
COMMUNITY
End: 2018-04-03

## 2018-04-02 RX ORDER — CLONIDINE HYDROCHLORIDE 0.1 MG/1
0.1 TABLET ORAL 3 TIMES DAILY
Status: DISCONTINUED | OUTPATIENT
Start: 2018-04-02 | End: 2018-04-03 | Stop reason: HOSPADM

## 2018-04-02 RX ADMIN — SODIUM CHLORIDE 1000 ML: 900 INJECTION, SOLUTION INTRAVENOUS at 09:38

## 2018-04-02 RX ADMIN — LORAZEPAM 0.5 MG: 0.5 TABLET ORAL at 15:18

## 2018-04-02 RX ADMIN — POTASSIUM CHLORIDE 20 MEQ: 40 SOLUTION ORAL at 15:19

## 2018-04-02 RX ADMIN — Medication 10 ML: at 15:18

## 2018-04-02 RX ADMIN — Medication 10 ML: at 21:45

## 2018-04-02 RX ADMIN — IOPAMIDOL 100 ML: 755 INJECTION, SOLUTION INTRAVENOUS at 08:20

## 2018-04-02 RX ADMIN — CLONIDINE HYDROCHLORIDE 0.1 MG: 0.1 TABLET ORAL at 15:18

## 2018-04-02 RX ADMIN — CLONIDINE HYDROCHLORIDE 0.1 MG: 0.1 TABLET ORAL at 21:43

## 2018-04-02 RX ADMIN — Medication 10 ML: at 08:20

## 2018-04-02 RX ADMIN — SODIUM CHLORIDE 100 ML: 900 INJECTION, SOLUTION INTRAVENOUS at 08:20

## 2018-04-02 RX ADMIN — OXYCODONE HYDROCHLORIDE AND ACETAMINOPHEN 500 MG: 500 TABLET ORAL at 17:14

## 2018-04-02 NOTE — ED PROVIDER NOTES
HPI Comments: 58 y.o. female with past medical history significant for HTN, degenerative arthritis of lumbar spine and insomnia who presents via EMS from home with chief complaint of weakness. Patient awakened at 0200 this morning as she was unable to sleep. At 0400, patient states left hand, left arm and left leg weakness onset. Per EMS, patient notes recent increase in anxiety and stress she suspects is causing difficulty sleeping. Patient's /113 en route. Patient denies anticoagulation. Patient specifically denies facial droop, numbness, tingling, dysphagia and visual changes. There are no other acute medical concerns at this time. Old Chart Review:  Patient evaluated in the ED on 3/9/18 with hypertension. Social hx: former tobacco smoker; social EtOH use; denies illicit drug use  PCP: Edna Cardenas MD    Note written by Samantha Mireles, as dictated by Merary Fitzpatrick MD 8:10 AM        The history is provided by the patient, the EMS personnel and medical records. No  was used.         Past Medical History:   Diagnosis Date    Colonic polyp 7/05         Cough variant asthma      Degenerative arthritis of lumbar spine 3/11    Eduardo/os    Insomnia, unspecified      Menstrual migraine      Other and unspecified hyperlipidemia     mild    Rectocele     Vasovagal syncope 3/24/2016       Past Surgical History:   Procedure Laterality Date    COLONOSCOPY  2009, 5/11    Vorenberg/cr    HX GYN  1990s    tubal ligation          Family History:   Problem Relation Age of Onset    Hypertension Mother     Psychiatric Disorder Father      depression/committed suicide   [place] Depression Father     Hypertension Father     Diabetes Sister     Cancer Sister      pancreatic    Depression Sister        Social History     Social History    Marital status:      Spouse name: N/A    Number of children: N/A    Years of education: N/A     Occupational History    [place] [employer]      Social History Main Topics    Smoking status: Former Smoker     Packs/day: 1.00     Years: 4.00     Quit date: 1978    Smokeless tobacco: Never Used    Alcohol use 2.4 oz/week     4 Glasses of wine, 0 Standard drinks or equivalent per week      Comment: daily    Drug use: No    Sexual activity: Not Currently     Other Topics Concern    Exercise Yes     [facility]     Social History Narrative          Sister           ALLERGIES: Losartan; Sulfa (sulfonamide antibiotics); and Vasotec [enalapril maleate]    Review of Systems   HENT: Negative for trouble swallowing. Eyes: Negative for visual disturbance. Neurological: Positive for weakness (left arm, left hand). Negative for dizziness, facial asymmetry, speech difficulty and numbness. All other systems reviewed and are negative. There were no vitals filed for this visit. Physical Exam   Constitutional: She is oriented to person, place, and time. She appears well-developed and well-nourished. NAD, AxOx4, speaking in complete sentences, GCS = 15     HENT:   Head: Normocephalic and atraumatic. Nose: Nose normal.   Mouth/Throat: Oropharynx is clear and moist.   Cn intact    No facial droop/ slurred speech/ tongue deviation    Able to repeat phrase 'every dog has his day' back to me   Eyes: Conjunctivae and EOM are normal. Pupils are equal, round, and reactive to light. Right eye exhibits no discharge. Left eye exhibits no discharge. No scleral icterus. Neck: Normal range of motion. Neck supple. No JVD present. No tracheal deviation present. Cardiovascular: Normal rate, regular rhythm, normal heart sounds and intact distal pulses. Exam reveals no gallop and no friction rub. No murmur heard. Pulmonary/Chest: Effort normal and breath sounds normal. No respiratory distress. She has no wheezes. She has no rales. She exhibits no tenderness. Abdominal: Soft. Bowel sounds are normal. There is no tenderness. There is no rebound and no guarding. nttp     Genitourinary: No vaginal discharge found. Genitourinary Comments: Pt denies urinary/ vaginal complaints   Musculoskeletal: Normal range of motion. She exhibits no edema or tenderness. Neurological: She is alert and oriented to person, place, and time. She has normal reflexes. No cranial nerve deficit. She exhibits normal muscle tone. Coordination normal.   pt has motor/ CV/ Sensation grossly intact to all extremities, R = L in strength;   Skin: Skin is warm and dry. No rash noted. No erythema. No pallor. Psychiatric: She has a normal mood and affect. Her behavior is normal. Thought content normal.   Nursing note and vitals reviewed. [facility]      ED Course       Procedures     Chief Complaint   Patient presents with    Extremity Weakness       11:33 AM  The patients presenting problems have been discussed, and they are in agreement with the care plan formulated and outlined with them. I have encouraged them to ask questions as they arise throughout their visit.     MEDICATIONS GIVEN:  Medications   sodium chloride 0.9 % bolus infusion 1,000 mL (1,000 mL IntraVENous New Bag 4/2/18 0938)   sodium chloride 0.9 % bolus infusion 100 mL (100 mL IntraVENous New Bag 4/2/18 0820)   iopamidol (ISOVUE-370) 76 % injection 100 mL (100 mL IntraVENous Given 4/2/18 0820)   sodium chloride (NS) flush 10 mL (10 mL IntraVENous Given 4/2/18 0820)       LABS REVIEWED:  Labs Reviewed   CBC WITH AUTOMATED DIFF - Abnormal; Notable for the following:        Result Value    MPV 8.5 (*)     NEUTROPHILS 79 (*)     All other components within normal limits   METABOLIC PANEL, COMPREHENSIVE - Abnormal; Notable for the following:     Potassium 3.3 (*)     Glucose 141 (*)     BUN/Creatinine ratio 24 (*)     All other components within normal limits   URINALYSIS W/MICROSCOPIC - Abnormal; Notable for the following:     Leukocyte Esterase TRACE (*)     All other components within normal limits GLUCOSE, POC - Abnormal; Notable for the following:     Glucose (POC) 133 (*)     All other components within normal limits   URINE CULTURE HOLD SAMPLE   PROTHROMBIN TIME + INR   CK W/ REFLX CKMB   NT-PRO BNP   TROPONIN I   MAGNESIUM   SAMPLES BEING HELD   POC INR       RADIOLOGY RESULTS:  The following have been ordered and reviewed:  _____________________________________________________________________  _____________________________________________________________________    EKG interpretation:   Rhythm: normal sinus rhythm; and regular . Rate (approx.): 62; Axis: normal; P wave: normal; QRS interval: normal ; ST/T wave: normal; Negative acute significant segmental elevations/  Unchanged when compared to study dated 03/09/2018    PROCEDURES:        CONSULTATIONS:       PROGRESS NOTES:      DIAGNOSIS:    1. Left-sided weakness        PLAN:  1-      ED COURSE: The patients hospital course has been uncomplicated. CONSULT NOTE:  8:13 AM Sam Lopez MD spoke with Dr. Samia Palm, Consult for tele-neurology. Discussed available diagnostic tests and clinical findings. Dr. Samia Palm recommends obtaining CTA. No TPA. Admit to hospitalist service for complete TIA evaluation. 9:13 AM  Discussed available lab and imaging results with patient. She verbalizes understanding and agrees with care plan. Will admit patient to hospitalist service for further evaluation and treatment. CONSULT NOTE:  10:39 AM Sam Lopez MD spoke with Dr. Katy Posada, Consult for Hospitalist.  Discussed available diagnostic tests and clinical findings. Dr. Katy Posada will evaluate the patient for possible admission.

## 2018-04-02 NOTE — PROGRESS NOTES
Occupational Therapy Note 1448    Orders acknowledged, chart reviewed. PT spoke with RN. Patient has been up independently in room, ambulating halls while eating snacks. No functional deficits. Transport bringing patient to the floor now. Will complete orders. Please re-consult if patient has any changes in functional status. Thank you.     Jacqueline Camara OT

## 2018-04-02 NOTE — IP AVS SNAPSHOT
[address] [facility]  [facility] 
[phone] Patient: Gee Neff MRN: [id number] VEM:[id number] About your hospitalization You were admitted on:  April 2, 2018 You last received care in the:  [address] [place] [address] NEURO-SCI TELE You were discharged on:  April 3, 2018 Why you were hospitalized Your primary diagnosis was:  Not on File Your diagnoses also included:  Tia (Transient Ischemic Attack) Follow-up Information Follow up With Details Comments Contact Info Farzana Olivas MD Go on 4/5/2018 hospital PCP f/u appointment on Thursday 4/5/18 @ 2:45 p.m. If patient is unable to attend, please call the office. [address] [facility] 
[phone] Your Scheduled Appointments Thursday April 05, 2018  2:45 PM EDT ROUTINE CARE with Farzana Olivas MD  
[facility] [address] [facility]  
[phone] Thursday April 19, 2018  8:15 AM EDT ROUTINE CARE with Farzana Olivas MD  
[facility] [address]  
[phone] Thursday May 03, 2018  8:20 AM EDT  
ESTABLISHED PATIENT with Denise Cline MD  
[facility]) [address] [facility]  
[phone] Discharge Orders None A check dimitri indicates which time of day the medication should be taken. My Medications START taking these medications Instructions Each Dose to Equal  
 Morning Noon Evening Bedtime  
 aspirin 81 mg chewable tablet Your last dose was: Your next dose is: Take 1 Tab by mouth daily. 81 mg LORazepam 0.5 mg tablet Commonly known as:  ATIVAN Your last dose was: Your next dose is: Take 1 Tab by mouth every six (6) hours as needed for Anxiety. Max Daily Amount: 2 mg. Indications: anxiety, anxiety 0.5 mg  
    
   
   
   
  
 pravastatin 40 mg tablet Commonly known as:  PRAVACHOL Your last dose was: Your next dose is: Take 1 Tab by mouth nightly. 40 mg CONTINUE taking these medications Instructions Each Dose to Equal  
 Morning Noon Evening Bedtime BC HEADACHE POWDER PO Your last dose was: Your next dose is: Take  by mouth three (3) times daily as needed. BIOTIN PO Your last dose was: Your next dose is: Take 500 mcg by mouth daily. 500 mcg CALCIUM CITRATE PO Your last dose was: Your next dose is: Take 630 mg by mouth two (2) times a day. 630 mg  
    
   
   
   
  
 cloNIDine HCl 0.1 mg tablet Commonly known as:  CATAPRES Your last dose was: Your next dose is: Take 1 Tab by mouth two (2) times a day. 0.1 mg  
    
   
   
   
  
 fiber Cap Your last dose was: Your next dose is: Take 2 Caps by mouth two (2) times a day. 2 Cap  
    
   
   
   
  
 spironolactone 25 mg tablet Commonly known as:  ALDACTONE Your last dose was: Your next dose is: Take 1 Tab by mouth daily. 25 mg STOOL SOFTENER PO Your last dose was: Your next dose is: Take 100 mg by mouth two (2) times daily as needed. 100 mg  
    
   
   
   
  
 VITAMIN C 500 mg tablet Generic drug:  ascorbic acid (vitamin C) Your last dose was: Your next dose is: Take 500 mg by mouth two (2) times a day. 500 mg  
    
   
   
   
  
 VITAMIN D3 PO Your last dose was: Your next dose is: Take 900 Units by mouth two (2) times a day. 900 Units  
    
   
   
   
  
 vitamin E 400 unit capsule Commonly known as:  [address] Your last dose was: Your next dose is: Take 400 Units by mouth daily. 400 Units STOP taking these medications   
 azithromycin 250 mg tablet Commonly known as:  Danica Young Where to Get Your Medications Information on where to get these meds will be given to you by the nurse or doctor. ! Ask your nurse or doctor about these medications  
  aspirin 81 mg chewable tablet LORazepam 0.5 mg tablet  
 pravastatin 40 mg tablet Discharge Instructions Discharge Instructions PATIENT ID: Sabine Quintero MRN: [id number] YOB: 1955 DATE OF ADMISSION: 4/2/2018  8:08 AM   
DATE OF DISCHARGE: 4/3/2018 PRIMARY CARE PROVIDER: Emma Villa MD  
 
ATTENDING PHYSICIAN: Bennett Fermin MD 
DISCHARGING PROVIDER: Bennett Fermin MD   
To contact this individual call [phone] and ask the  to page. If unavailable ask to be transferred the Adult Hospitalist Department. DISCHARGE DIAGNOSES  
TIA  
 
CONSULTATIONS: IP CONSULT TO NEUROLOGY 
IP CONSULT TO PSYCHIATRY IP CONSULT TO HOSPITALIST 
 
PROCEDURES/SURGERIES: * No surgery found * PENDING TEST RESULTS:  
At the time of discharge the following test results are still pending: FOLLOW UP APPOINTMENTS:  
Follow-up Information Follow up With Details Comments Contact Info Emma Villa MD In 1 week  [address] [facility] [address] [place] [address] 
[phone] ADDITIONAL CARE RECOMMENDATIONS:  
Please take daily aspirin as advised Low dose cholesterol medication as well Continue to take BP medication as before and donot change dose yet and see pcp for that Advised to get fasting cholesterol panel before starting the cholesterol pill . We could not get here as not fasting by the time I saw DIET: Cardiac Diet Oral Nutritional Supplements: 
ACTIVITY: Activity as tolerated WOUND CARE:  
 
EQUIPMENT needed:  
 
 
  
 SNF/Inpatient Rehab/LTAC Independent/assisted living Hospice Other: CDMP Checked:  
Yes x PROBLEM LIST Updated: 
Yes x Signed:  
Margrette Cooks, MD 
4/3/2018 10:13 AM 
 
  
  
  
Delver Announcement We are excited to announce that we are making your provider's discharge notes available to you in Delver. You will see these notes when they are completed and signed by the physician that discharged you from your recent hospital stay. If you have any questions or concerns about any information you see in Delver, please call the Health Information Department where you were seen or reach out to your Primary Care Provider for more information about your plan of care. Introducing [facility]! Dear Sachin Villa: 
Thank you for requesting a Delver account. Our records indicate that you already have an active Delver account. You can access your account anytime at [email or web address] Did you know that you can access your hospital and ER discharge instructions at any time in Delver?   You can also review all of your test results from your hospital stay or ER visit. Additional Information If you have questions, please visit the Frequently Asked Questions section of the [employer] System[employer]art website at [email or web address] Remember, DecisionView is NOT to be used for urgent needs. For medical emergencies, dial 911. Now available from your iPhone and Android! Introducing Ezio Aguilar As a [facility] patient, I wanted to make you aware of our electronic visit tool called Ezio Aguilar. [employer] allows you to connect within minutes with a medical provider 24 hours a day, seven days a week via a mobile device or tablet or logging into a secure website from your computer. You can access Ezio Exo from anywhere in the [place]. A virtual visit might be right for you when you have a simple condition and feel like you just dont want to get out of bed, or cant get away from work for an appointment, when your regular [facility] provider is not available (evenings, weekends or holidays), or when youre out of town and need minor care. Electronic visits cost only $49 and if the [employer] provider determines a prescription is needed to treat your condition, one can be electronically transmitted to a nearby pharmacy*. Please take a moment to enroll today if you have not already done so. The enrollment process is free and takes just a few minutes. To enroll, please download the [employer] maria fernanda to your tablet or phone, or visit [email or web address] to enroll on your computer. And, as an [address] [place] [address] patient with a [employer] account, the results of your visits will be scanned into your electronic medical record and your primary care provider will be able to view the scanned results. We urge you to continue to see your regular [facility] provider for your ongoing medical care.   And while your primary care provider may not be the one available when you seek a [employer]sethfin virtual visit, the peace of mind you get from getting a real diagnosis real time can be priceless. For more information on Tintri, view our Frequently Asked Questions (FAQs) at [email or web address]. Sincerely, 
 
Natalie Villegas MD 
Chief Medical Officer Flavio Bennett *:  certain medications cannot be prescribed via [employer]yemi Providers Seen During Your Hospitalization Provider Specialty Primary office phone Malinda Alatorre MD Emergency Medicine [phone] Gagan Kelsey MD Internal Medicine [phone] Your Primary Care Physician (PCP) Primary Care Physician Office Phone Office Fax [address], [facility] [phone] You are allergic to the following Allergen Reactions Losartan Other (comments) Nausea. Sulfa (Sulfonamide Antibiotics) Rash Vasotec (Enalapril Maleate) Other (comments) Nausea, decreased appetite bad taste. Recent Documentation Height Weight BMI OB Status Smoking Status 1.651 m 54.4 kg 19.35 kg/m2 Postmenopausal Former Smoker Emergency Contacts Name Discharge Info Relation Home Work Mobile Carly Henry [1] Friend [5] [phone] Patient Belongings The following personal items are in your possession at time of discharge: 
  Dental Appliances: None  Visual Aid: None      Home Medications: None   Jewelry: None  Clothing: At bedside    Other Valuables: None Please provide this summary of care documentation to your next provider. Signatures-by signing, you are acknowledging that this After Visit Summary has been reviewed with you and you have received a copy. Patient Signature:  ____________________________________________________________ Date:  ____________________________________________________________  
  
Claudio Hudson  Provider Signature: ____________________________________________________________ Date:  ____________________________________________________________

## 2018-04-02 NOTE — ED NOTES
MRI called to check if they could take pt prior to sending to [facility], they stated that they are unable to do so at this time.

## 2018-04-02 NOTE — PROGRESS NOTES
Responded to Whitney Phillips in [facility]. Pt was at CT no family present at this time. Contact  as needed.    MERLIN ChristiansenDiv, MACE   [phone])

## 2018-04-02 NOTE — H&P
History and Physical  Primary Care Provider: Phil Moore MD    Subjective:     Dione Gupta is a 58 y.o. female with a  History of HTN who presents with L sided weakness that has now resolved, a code S was called in the ED. Patient stating this has never happened to her before, but she has been very stressed and fatigued because her mother has been ill. Her concern is that when she was weak it was worse on the L side and she knows this is a sign of a stroke. Stating she is wondering if she should take medication for anxiety. Patient also notes that her BP has been running very high at home and has been poorly controlled. She has been diagnosed with white-coat HTN in the past, but now even home readings are elevated. On ROS, patient without current weakness, numbness, tingling, SOB, dizziness, CP, palpitations or other symptoms. Stating she is now at her baseline. CT and CTA were negative and tele-Neuro was consulted recommending admission. Neurology saw the patient and stated she did not need an MRI. TTE is pending. Review of Systems:  Further 10 point review of systems is benign. Pertinent items are noted in the History of Present Illness. Past Medical History:   Diagnosis Date    Colonic polyp 7/05         Cough variant asthma      Degenerative arthritis of lumbar spine 3/11    Eduardo/os    Insomnia, unspecified      Menstrual migraine      Other and unspecified hyperlipidemia     mild    Rectocele     Vasovagal syncope 3/24/2016      Past Surgical History:   Procedure Laterality Date    COLONOSCOPY  2009, 5/11    Mickie/sol    HX GYN  1990s    tubal ligation      Prior to Admission medications    Medication Sig Start Date End Date Taking? Authorizing Provider   azithromycin (ZITHROMAX) 250 mg tablet Take 500 mg by mouth See Admin Instructions.  500mg x 1 then 250mg daily x 5 days   Indications: sinus infection   Yes Historical Provider   spironolactone (ALDACTONE) 25 mg tablet Take 1 Tab by mouth daily. 3/13/18  Yes Juan Diego Reese MD   cloNIDine HCl (CATAPRES) 0.1 mg tablet Take 1 Tab by mouth two (2) times a day. 3/13/18  Yes Juan Diego Reese MD   CALCIUM CITRATE PO Take 630 mg by mouth two (2) times a day. Yes Historical Provider   ASPIRIN/SALICYLAMIDE/CAFFEINE (BC HEADACHE POWDER PO) Take  by mouth three (3) times daily as needed. Yes Historical Provider   CHOLECALCIFEROL, VITAMIN D3, (VITAMIN D3 PO) Take 900 Units by mouth two (2) times a day. Yes Historical Provider   ascorbic acid (VITAMIN C) 500 mg tablet Take 500 mg by mouth two (2) times a day. Yes Historical Provider   BIOTIN PO Take 500 mcg by mouth daily. Yes Historical Provider   vitamin E (AQUA GEMS) 400 unit capsule Take 400 Units by mouth daily. Yes Historical Provider   fiber Cap Take 2 Caps by mouth two (2) times a day. Yes Historical Provider   DOCUSATE CALCIUM (STOOL SOFTENER PO) Take 100 mg by mouth two (2) times daily as needed. Historical Provider     Allergies   Allergen Reactions    Losartan Other (comments)     Nausea.  Sulfa (Sulfonamide Antibiotics) Rash    Vasotec [Enalapril Maleate] Other (comments)     Nausea, decreased appetite bad taste. Family History   Problem Relation Age of Onset    Hypertension Mother     Psychiatric Disorder Father      depression/committed suicide   [place] Depression Father     Hypertension Father     Diabetes Sister     Cancer Sister      pancreatic    Depression Sister         SOCIAL HISTORY:  Patient resides at home. Patient ambulates without assistance. Smoking history: Does not smoke. Alcohol history: Occasional wine. Objective:       Physical Exam:   Visit Vitals    /90    Pulse 70    Temp 98.2 °F (36.8 °C)    Resp 17    Ht 5' 5\" (1.651 m)    Wt 58.9 kg (129 lb 14.4 oz)    LMP 04/01/2007    SpO2 99%    BMI 21.62 kg/m2     General:  Alert, cooperative, no distress, appears stated age. Head:  Normocephalic.    Eyes: Conjunctivae/corneas clear. PERRL, EOMs intact. .   Throat: Lips, mucosa, and tongue normal.    Neck: Supple, symmetrical, trachea midline. Lungs:   Clear to auscultation bilaterally. Heart:  Regular rate and rhythm, S1, S2 normal, no murmur. Abdomen:   Soft, non-tender. Bowel sounds normal. No masses,  No organomegaly. Extremities: Extremities normal, atraumatic, no cyanosis or edema. Pulses: 2+ and symmetric all extremities. Neurologic: CNII-XII intact. Normal strength, sensation throughout. Psych: anxious affect       ECG:  normal EKG, normal sinus rhythm     Data Review: All diagnostic labs and studies have been reviewed. Assessment:     Active Problems:    TIA (transient ischemic attack) (4/2/2018)      Plan:     TIA vs. Anxiety attack vs. Hypertensive related neurologic disturbance: Patient presented with L sided weakness that has now resolved. -CT and CTA have been negative  -monitor in Neuro unit under observation  -spoke with Neurology. No need for MRI  -TTE done and results pending  -continue to monitor on telemetry  -consult to Psych for severe anxiety    HTN: Patient on clonidine BID and spironolactone at home.  -will increase clonidine to TID for better control  -monitor per unit protocol    Hypokalemia, replete.     SCDs  DNR, discussed this with patient she states she does have an Advanced Directive, but did not bring it with her    Signed By: Tamara Astudillo MD     April 2, 2018

## 2018-04-02 NOTE — ED NOTES
TRANSFER - OUT REPORT:    Verbal report given to Shahnaz Paetl RN(name) on [facility] Reason  being transferred to [facility](unit) for routine progression of care       Report consisted of patients Situation, Background, Assessment and   Recommendations(SBAR). Information from the following report(s) SBAR, ED Summary, Procedure Summary, MAR and Recent Results was reviewed with the receiving nurse. Lines:   Peripheral IV 04/02/18 Left Antecubital (Active)   Site Assessment Clean, dry, & intact 4/2/2018  7:45 AM   Phlebitis Assessment 0 4/2/2018  7:45 AM   Infiltration Assessment 0 4/2/2018  7:45 AM   Dressing Status Clean, dry, & intact 4/2/2018  7:45 AM   Hub Color/Line Status Green 4/2/2018  7:45 AM        Opportunity for questions and clarification was provided.       Patient transported with:   Monitor and Paramedic

## 2018-04-02 NOTE — CONSULTS
[address]  MR#: [id number]  : 1955  ACCOUNT #: [de-identified]   DATE OF SERVICE: 2018    REQUESTING PHYSICIAN:  Dr. Jose Guadalupe Carlson. REASON FOR EVALUATION:  Left-sided weakness. HISTORY OF PRESENT ILLNESS:  The patient is a 22-year-old female with a history of hypertension. She presented to the hospital 2 weeks ago with uncontrolled hypertension and her medications were changed/suggested. She was doing quite well until about a week ago when her mother had a fall and sustained multiple trauma. She is currently in rehab and she had been taking care of her. She was under a lot of stress and had not been sleeping more than 2-3 hours per night for the past several days. She has noticed that her blood pressure had been trending up for the past few days, and this morning, she noticed that it was in the high 439 systolic and diastolic was over 800. At the same time, she noticed that she was not as strong on the left arm and leg when compared to the right. These symptoms concerned her and she called 911 and was brought into the hospital.  After arriving here, her symptoms of weakness had started to improve. Her BP en route to the hospital was 192/113. Denies any headache, changes in vision, speech or swallowing ability. No sensory deficits. No chest pain, shortness of breath or palpitations. PAST MEDICAL HISTORY:  As mentioned above. FAMILY HISTORY:  Positive for hypertension in both parents, diabetes in sister. SOCIAL HISTORY:  She is . Former smoker, quit in . Drinks wine on a routine basis at dinner. She exercises regularly. ALLERGIES:  LOSARTAN, SULFA DRUGS, VASOTEC. REVIEW OF SYSTEMS:  As mentioned above. PHYSICAL EXAMINATION:  GENERAL:  Patient is alert, fully oriented. VITAL SIGNS:  Blood pressure 156/81, temperature 99, pulse of 66. NEUROLOGIC: Speech is clear.   Comprehension is normal.  HEENT: Pupils equal, round and reactive. Extraocular movements are full. Face is symmetric. Tongue is midline. Hearing is baseline. MUSCULOSKELETAL: Muscle tone and bulk normal.  Strength normal in both upper and lower extremities. DTRs 2/2 and symmetric. Toes downgoing. Sensation intact. Gait exam is deferred. HEART:  Regular rate and rhythm. CHEST:  Clear. ABDOMEN:  Soft, nontender, positive bowel sounds. EXTREMITIES:  No edema. LABORATORY DATA:  CBC is unremarkable. Chemistries are unremarkable. Last lipid profile in June 2017 showed an LDL of 112, total cholesterol 223. CT scan of the brain was unremarkable. CTA of the head and neck did not show any significant abnormality or stenosis. Echocardiogram results are pending. ASSESSMENT AND PLAN:  A 60-year-old female with poorly controlled hypertension who is admitted with mild left-sided weakness, which has now resolved. She has also had anxiety along with insomnia over the past few days. I suspect that the weakness was likely due to hypertension and possibly anxiety. CT CTA of the head and neck is negative. I recommend taking baby aspirin on a daily basis and start a low dose statin as well given last LDL was 112. Would defer MRI as it would not . May initiate discharge planning once her blood pressure stabilizes. Please feel free to contact me with any further questions. Thank you for this consultation.       MD JERAMIE Jimenez / ALDO  D: 04/02/2018 11:37     T: 04/02/2018 12:00  JOB #: [id number]

## 2018-04-02 NOTE — ED TRIAGE NOTES
Pt states she woke 2 am feeling normal, and around 4 am she developed left hand and leg weakness. On arrival pt has states that all symptoms have resolved, stating that she has been under a lot of stress recently and is very anxious. Pt states that she took extra clonidine this morning in an attempt to lower her BP.

## 2018-04-02 NOTE — ROUTINE PROCESS
TRANSFER - OUT REPORT:    Verbal report given to Lidia Wolf RN(name) on Namrata Duggan  being transferred to [facility](unit) for routine progression of care       Report consisted of patients Situation, Background, Assessment and   Recommendations(SBAR). Information from the following report(s) SBAR and Kardex was reviewed with the receiving nurse. Lines:   Peripheral IV 04/02/18 Left Antecubital (Active)   Site Assessment Clean, dry, & intact 4/2/2018  7:45 AM   Phlebitis Assessment 0 4/2/2018  7:45 AM   Infiltration Assessment 0 4/2/2018  7:45 AM   Dressing Status Clean, dry, & intact 4/2/2018  7:45 AM   Hub Color/Line Status Green 4/2/2018  7:45 AM        Opportunity for questions and clarification was provided.       Patient transported with:   [place]

## 2018-04-02 NOTE — PROGRESS NOTES
Admission Medication Reconciliation:    Information obtained from: patient    Significant PMH/Disease States:   Past Medical History:   Diagnosis Date    Colonic polyp 7/05         Cough variant asthma      Degenerative arthritis of lumbar spine 3/11    Eduardo/os    Insomnia, unspecified      Menstrual migraine      Other and unspecified hyperlipidemia     mild    Rectocele     Vasovagal syncope 3/24/2016       Chief Complaint for this Admission:  high blood pressure    Allergies:  Losartan; Sulfa (sulfonamide antibiotics); and Vasotec [enalapril maleate]    Prior to Admission Medications:   Prior to Admission Medications   Prescriptions Last Dose Informant Patient Reported? Taking? ASPIRIN/SALICYLAMIDE/CAFFEINE (BC HEADACHE POWDER PO)  Self Yes Yes   Sig: Take  by mouth three (3) times daily as needed. BIOTIN PO 4/1/2018 at Unknown time Self Yes Yes   Sig: Take 500 mcg by mouth daily. CALCIUM CITRATE PO 4/1/2018 at Unknown time Self Yes Yes   Sig: Take 630 mg by mouth two (2) times a day. CHOLECALCIFEROL, VITAMIN D3, (VITAMIN D3 PO) 4/1/2018 at Unknown time Self Yes Yes   Sig: Take 900 Units by mouth two (2) times a day. DOCUSATE CALCIUM (STOOL SOFTENER PO)  Self Yes No   Sig: Take 100 mg by mouth two (2) times daily as needed. ascorbic acid (VITAMIN C) 500 mg tablet 4/1/2018 at Unknown time Self Yes Yes   Sig: Take 500 mg by mouth two (2) times a day. azithromycin (ZITHROMAX) 250 mg tablet 4/2/2018 at am Self Yes Yes   Sig: Take 500 mg by mouth See Admin Instructions. 500mg x 1 then 250mg daily x 5 days   Indications: sinus infection   cloNIDine HCl (CATAPRES) 0.1 mg tablet 4/2/2018 at 0400 Self No Yes   Sig: Take 1 Tab by mouth two (2) times a day. fiber Cap 4/1/2018 at Unknown time Self Yes Yes   Sig: Take 2 Caps by mouth two (2) times a day. spironolactone (ALDACTONE) 25 mg tablet 4/1/2018 at am Self No Yes   Sig: Take 1 Tab by mouth daily.    vitamin E (AQUA GEMS) 400 unit capsule 4/1/2018 at Unknown time Self Yes Yes   Sig: Take 400 Units by mouth daily. Facility-Administered Medications: None   Comments/Recommendations: Medication review done with patient. Removed fluticasone and tretinoin  ADDED: azithromycin. Patient started Z-Destin today for sinus infection  Patient took extra clonidine tablet x 3 doses for elevated blood pressure. She feels stressed due to her mother taking a fall and upcoming move. Allergies reviewed.

## 2018-04-02 NOTE — CONSULTS
Consult dictated. Transient symptoms of mild left sided weakness associated with elevated BP and anxiety. CT/CTA is negative. Recommend baby ASA+statin (last ). Defer MRI.  May initiate Daysi Salinas MD

## 2018-04-02 NOTE — ROUTINE PROCESS
Primary Nurse Karen Jordan RN and Cholo Soto RN performed a dual skin assessment on this patient No impairment noted  Young score is 23

## 2018-04-02 NOTE — PROGRESS NOTES
Chart reviewed. Patient is being transferred from ED up to the 6th floor. Patient witnessed getting out of bed and ambulating in the ED looking for crackers independently and without device. No unsteadiness or weakness observed. Brief discussion with patient confirms that all L sided symptoms have resolved and patient is eager to be discharged home. No falls reported. She has no further questions. Please re-consult should functional status change. Will be signing off at this time.       Mary Mcfarlane PT, DPT

## 2018-04-02 NOTE — IP AVS SNAPSHOT
[address] [facility] [address] [place] [address] 
[phone] Patient: Heraclio Roth MRN: [id number]:[id number] A check dimitri indicates which time of day the medication should be taken. My Medications START taking these medications Instructions Each Dose to Equal  
 Morning Noon Evening Bedtime  
 aspirin 81 mg chewable tablet Your last dose was: Your next dose is: Take 1 Tab by mouth daily. 81 mg LORazepam 0.5 mg tablet Commonly known as:  ATIVAN Your last dose was: Your next dose is: Take 1 Tab by mouth every six (6) hours as needed for Anxiety. Max Daily Amount: 2 mg. Indications: anxiety, anxiety 0.5 mg  
    
   
   
   
  
 pravastatin 40 mg tablet Commonly known as:  PRAVACHOL Your last dose was: Your next dose is: Take 1 Tab by mouth nightly. 40 mg CONTINUE taking these medications Instructions Each Dose to Equal  
 Morning Noon Evening Bedtime BC HEADACHE POWDER PO Your last dose was: Your next dose is: Take  by mouth three (3) times daily as needed. BIOTIN PO Your last dose was: Your next dose is: Take 500 mcg by mouth daily. 500 mcg CALCIUM CITRATE PO Your last dose was: Your next dose is: Take 630 mg by mouth two (2) times a day. 630 mg  
    
   
   
   
  
 cloNIDine HCl 0.1 mg tablet Commonly known as:  CATAPRES Your last dose was: Your next dose is: Take 1 Tab by mouth two (2) times a day. 0.1 mg  
    
   
   
   
  
 fiber Cap Your last dose was: Your next dose is: Take 2 Caps by mouth two (2) times a day. 2 Cap  
    
   
   
   
  
 spironolactone 25 mg tablet Commonly known as:  ALDACTONE Your last dose was: Your next dose is: Take 1 Tab by mouth daily. 25 mg STOOL SOFTENER PO Your last dose was: Your next dose is: Take 100 mg by mouth two (2) times daily as needed. 100 mg  
    
   
   
   
  
 VITAMIN C 500 mg tablet Generic drug:  ascorbic acid (vitamin C) Your last dose was: Your next dose is: Take 500 mg by mouth two (2) times a day. 500 mg  
    
   
   
   
  
 VITAMIN D3 PO Your last dose was: Your next dose is: Take 900 Units by mouth two (2) times a day. 900 Units  
    
   
   
   
  
 vitamin E 400 unit capsule Commonly known as:  Avenmiesha Forspeedy [address] Your last dose was: Your next dose is: Take 400 Units by mouth daily. 400 Units STOP taking these medications   
 azithromycin 250 mg tablet Commonly known as:  Tripp Ibarra Where to Get Your Medications Information on where to get these meds will be given to you by the nurse or doctor. ! Ask your nurse or doctor about these medications  
  aspirin 81 mg chewable tablet LORazepam 0.5 mg tablet  
 pravastatin 40 mg tablet

## 2018-04-02 NOTE — ED NOTES
Spoke with Dr. Maxim Connell to let her know about what neurology plan is to defer the MRI and discharge. Told her she needs to cancel admission and discharge. She verbalized understanding.

## 2018-04-03 VITALS
OXYGEN SATURATION: 99 % | RESPIRATION RATE: 15 BRPM | HEART RATE: 81 BPM | SYSTOLIC BLOOD PRESSURE: 139 MMHG | HEIGHT: 65 IN | DIASTOLIC BLOOD PRESSURE: 89 MMHG | WEIGHT: 119.9 LBS | TEMPERATURE: 98.3 F | BODY MASS INDEX: 19.98 KG/M2

## 2018-04-03 LAB
EST. AVERAGE GLUCOSE BLD GHB EST-MCNC: 114 MG/DL
HBA1C MFR BLD: 5.6 % (ref 4.2–6.3)

## 2018-04-03 PROCEDURE — 99218 HC RM OBSERVATION: CPT

## 2018-04-03 PROCEDURE — 74011250637 HC RX REV CODE- 250/637: Performed by: INTERNAL MEDICINE

## 2018-04-03 RX ORDER — PRAVASTATIN SODIUM 40 MG/1
40 TABLET ORAL
Qty: 90 TAB | Refills: 0 | Status: SHIPPED | OUTPATIENT
Start: 2018-04-03 | End: 2018-04-05

## 2018-04-03 RX ORDER — GUAIFENESIN 100 MG/5ML
81 LIQUID (ML) ORAL DAILY
Qty: 90 TAB | Refills: 0 | Status: SHIPPED | OUTPATIENT
Start: 2018-04-03

## 2018-04-03 RX ORDER — LORAZEPAM 0.5 MG/1
0.5 TABLET ORAL
Qty: 12 TAB | Refills: 0 | Status: SHIPPED | OUTPATIENT
Start: 2018-04-03 | End: 2018-04-05 | Stop reason: ALTCHOICE

## 2018-04-03 RX ORDER — CLONIDINE HYDROCHLORIDE 0.1 MG/1
0.1 TABLET ORAL 3 TIMES DAILY
Qty: 90 TAB | Refills: 0 | Status: SHIPPED | OUTPATIENT
Start: 2018-04-03 | End: 2018-04-03

## 2018-04-03 RX ADMIN — OXYCODONE HYDROCHLORIDE AND ACETAMINOPHEN 500 MG: 500 TABLET ORAL at 10:49

## 2018-04-03 RX ADMIN — LORAZEPAM 0.5 MG: 0.5 TABLET ORAL at 05:42

## 2018-04-03 RX ADMIN — ASPIRIN 81 MG 81 MG: 81 TABLET ORAL at 10:49

## 2018-04-03 RX ADMIN — CLONIDINE HYDROCHLORIDE 0.1 MG: 0.1 TABLET ORAL at 10:49

## 2018-04-03 RX ADMIN — SPIRONOLACTONE 25 MG: 25 TABLET, FILM COATED ORAL at 10:49

## 2018-04-03 RX ADMIN — LORAZEPAM 0.5 MG: 0.5 TABLET ORAL at 14:06

## 2018-04-03 RX ADMIN — Medication 10 ML: at 05:42

## 2018-04-03 NOTE — INTERDISCIPLINARY ROUNDS
IDR/SLIDR Summary          Patient: Kendra Cramer MRN: [id number]    Age: 58 y.o. YOB: 1955 Room/Bed: [facility]   Admit Diagnosis: TIA (transient ischemic attack)  Principal Diagnosis: <principal problem not specified>   Goals: safety, BP control  Readmission: NO  Quality Measure: Not applicable  VTE Prophylaxis: Mechanical  Influenza Vaccine screening completed? YES  Pneumococcal Vaccine screening completed? NO  Mobility needs: No   Nutrition plan:Yes  Consults:Case Management    Financial concerns:No  Escalated to ? NO  RRAT Score: 3   Interventions:Home Health  Testing due for pt today? NO  LOS: 2 days Expected length of stay 2 days  Discharge plan: home   PCP: Jose Reyes MD  Transportation needs: No    Days before discharge:one day until discharge   Discharge disposition: Home    Signed:      [facility], RN  4/3/2018  1:25 AM

## 2018-04-03 NOTE — PROGRESS NOTES
Problem: Falls - Risk of  Goal: *Absence of Falls  Document Jamari Fall Risk and appropriate interventions in the flowsheet.    Outcome: Progressing Towards Goal  Fall Risk Interventions:            Medication Interventions: Assess postural VS orthostatic hypotension, Patient to call before getting OOB, Teach patient to arise slowly         History of Falls Interventions: Evaluate medications/consider consulting pharmacy

## 2018-04-03 NOTE — DISCHARGE SUMMARY
Discharge Summary       PATIENT ID: Abhijit Lindo  MRN: [id number]   YOB: 1955    DATE OF ADMISSION: 4/2/2018  8:08 AM    DATE OF DISCHARGE: 4/3/18    PRIMARY CARE PROVIDER: Yolis Lai MD     ATTENDING PHYSICIAN: Lidia Michel   DISCHARGING PROVIDER: Karen Hopkins MD    To contact this individual call [phone] and ask the  to page. If unavailable ask to be transferred the Adult Hospitalist Department. CONSULTATIONS: IP CONSULT TO NEUROLOGY  IP CONSULT TO PSYCHIATRY  IP CONSULT TO HOSPITALIST    PROCEDURES/SURGERIES: * No surgery found *    ADMITTING [facility] COURSE:       Abhijit Lindo is a 58 y.o. female with a  History of HTN who presents with L sided weakness that has now resolved, a code S was called in the ED.      Patient stating this has never happened to her before, but she has been very stressed and fatigued because her mother has been ill. Her concern is that when she was weak it was worse on the L side and she knows this is a sign of a stroke. Stating she is wondering if she should take medication for anxiety.      Patient also notes that her BP has been running very high at home and has been poorly controlled. She has been diagnosed with white-coat HTN in the past, but now even home readings are elevated.     On ROS, patient without current weakness, numbness, tingling, SOB, dizziness, CP, palpitations or other symptoms. Stating she is now at her baseline.     CT and CTA were negative and tele-Neuro was consulted recommending admission. Neurology saw the patient and stated she did not need an MRI. TTE is pending.      Review of Systems:  Further 10 point review of systems is benign. Pertinent items are noted in the History of Present Illness.      CT Results  (Last 48 hours)               04/02/18 0841  CTA CODE NEURO HEAD AND NECK W CONT Final result    Impression:  IMPRESSION:   No significant abnormality related to arch, neck and head arteries. Narrative:  EXAM:  CTA CODE NEURO HEAD AND NECK W CONT   INDICATION:  TIA sx, patient had acute onset left hand and leg weakness at 0400   this morning. TIA versus CVA. TECHNIQUE:    Axial spiral acquired CT angiography was performed from the aortic arch up   through the intracranial vessels. This was performed during intravenous bolus   infusion 100 mL of Isovue 370. Post-processing with  MIP reconstructions from   aortic arch to the skull base. CT dose reduction was achieved through use of a   standardized protocol tailored for this examination and automatic exposure   control for dose modulation. COMPARISON: CT head   FINDINGS:   Normal origins the brachycephalic arteries from the arch. Left vertebral artery is dominant. Normal vertebral artery origins. Basilar   artery is normal and supplies both posterior cerebral arteries. Common carotid arteries, carotid bifurcations and cervical internal carotid   arteries are normal. 0% stenosis by NASCET criteria. Carotid siphons are well-maintained without stenosis. Symmetric opacification of middle and anterior cerebral arteries. No evidence of occlusion, intraluminal filling defect or other acute arterial   abnormality. Incidental note of mild chronic findings of cervical spondylosis and facet   arthrosis. 04/02/18 0815  CT CODE NEURO HEAD WO CONTRAST Final result    Impression:  IMPRESSION: No acute abnormality or change. Narrative:  EXAM:  CT CODE NEURO HEAD WO CONTRAST   INDICATION:  STROKE, acute onset left hand and leg weakness at 0 400 this   morning. TECHNIQUE: Thin axial images were obtained through the calvarium and saved with   standard and bone window algorithm. Coronal and sagittal reconstructions were   generated. CT dose reduction was achieved through use of a standardized   protocol tailored for this examination and automatic exposure control for dose   modulation. COMPARISON: None available.    FINDINGS: The ventricular size and configuration are within normal limits. The cerebral density is normal throughout. No evidence of intracranial hemorrhage, infarct, mass or abnormal extra-axial   fluid collections. Visualized osseous structures of the calvarium and skull base including   paranasal sinuses are unremarkable. No results found for this or any previous visit (from the past 24 hour(s)). TIA vs. Anxiety attack vs. Hypertensive related neurologic disturbance: Patient presented with L sided weakness that has now resolved. -CT and CTA have been negative  -monitor in Neuro unit under observation  -spoke with Neurology. No need for MRI  -TTE done and is normal   Asa and statin on discharge      HTN: Patient on clonidine BID and spironolactone at home. Stab;e on discharge      Hypokalemia, replete. DISCHARGE DIAGNOSES / PLAN:      1. PENDING TEST RESULTS:   At the time of discharge the following test results are still pending:     FOLLOW UP APPOINTMENTS:    Follow-up Information     Follow up With Details Comments [address], [place] In 1 week  [address]   [address] [facility] [address] [place] [address]             ADDITIONAL CARE RECOMMENDATIONS:   Please see instructions     DIET: Cardiac Diet  Oral Nutritional Supplements:     ACTIVITY: Activity as tolerated    WOUND CARE:     EQUIPMENT needed:       DISCHARGE MEDICATIONS:  Current Discharge Medication List      START taking these medications    Details   aspirin 81 mg chewable tablet Take 1 Tab by mouth daily. Qty: 90 Tab, Refills: 0      pravastatin (PRAVACHOL) 40 mg tablet Take 1 Tab by mouth nightly. Qty: 90 Tab, Refills: 0         CONTINUE these medications which have NOT CHANGED    Details   spironolactone (ALDACTONE) 25 mg tablet Take 1 Tab by mouth daily. Qty: 90 Tab, Refills: 0      cloNIDine HCl (CATAPRES) 0.1 mg tablet Take 1 Tab by mouth two (2) times a day.   Qty: 180 Tab, Refills: 0 CALCIUM CITRATE PO Take 630 mg by mouth two (2) times a day. ASPIRIN/SALICYLAMIDE/CAFFEINE (BC HEADACHE POWDER PO) Take  by mouth three (3) times daily as needed. CHOLECALCIFEROL, VITAMIN D3, (VITAMIN D3 PO) Take 900 Units by mouth two (2) times a day. ascorbic acid (VITAMIN C) 500 mg tablet Take 500 mg by mouth two (2) times a day. BIOTIN PO Take 500 mcg by mouth daily. vitamin E (AQUA GEMS) 400 unit capsule Take 400 Units by mouth daily. fiber Cap Take 2 Caps by mouth two (2) times a day. DOCUSATE CALCIUM (STOOL SOFTENER PO) Take 100 mg by mouth two (2) times daily as needed. STOP taking these medications       azithromycin (ZITHROMAX) 250 mg tablet Comments:   Reason for Stopping:                 NOTIFY YOUR PHYSICIAN FOR ANY OF THE FOLLOWING:   Fever over 101 degrees for 24 hours. Chest pain, shortness of breath, fever, chills, nausea, vomiting, diarrhea, change in mentation, falling, weakness, bleeding. Severe pain or pain not relieved by medications. Or, any other signs or symptoms that you may have questions about.     DISPOSITION:    Home With:   OT  PT  HH  RN       Long term SNF/Inpatient Rehab    Independent/assisted living    Hospice    Other:       PATIENT CONDITION AT DISCHARGE:     Functional status    Poor     Deconditioned    x Independent      Cognition    x Lucid     Forgetful     Dementia      Catheters/lines (plus indication)    Ellis     PICC     PEG    x None      Code status    x Full code     DNR      PHYSICAL EXAMINATION AT DISCHARGE:   Refer to Progress Note  Visit Vitals    /78 (BP 1 Location: Left arm, BP Patient Position: At rest)    Pulse 64    Temp 98.2 °F (36.8 °C)    Resp 16    Ht 5' 5\" (1.651 m)    Wt 54.4 kg (119 lb 14.4 oz)    SpO2 99%    BMI 19.35 kg/m2     Stable   cvs s1s2  achest clear   abd soft   Neuro , no deficcit   Power 4/5 b/l       CHRONIC MEDICAL DIAGNOSES:  Problem List as of 4/3/2018  Date Reviewed: 6/27/2017          Codes Class Noted - Resolved    TIA (transient ischemic attack) ICD-10-CM: G45.9  ICD-9-CM: 435.9  4/2/2018 - Present        Bad taste in mouth ICD-10-CM: R43.8  ICD-9-CM: 781.1  1/30/2018 - Present        Dyslipidemia ICD-10-CM: E78.5  ICD-9-CM: 272.4  1/30/2018 - Present        EKG abnormalities ICD-10-CM: R94.31  ICD-9-CM: 794.31  1/30/2018 - Present        White coat syndrome with diagnosis of hypertension ICD-10-CM: I10  ICD-9-CM: 401.9  6/27/2017 - Present        Sinus bradycardia ICD-10-CM: R00.1  ICD-9-CM: 427.89  6/1/2017 - Present        Vasovagal syncope ICD-10-CM: R55  ICD-9-CM: 780.2  3/24/2016 - Present        RESOLVED: Hypertension ICD-10-CM: I10  ICD-9-CM: 401.9  11/5/2014 - 6/27/2017        RESOLVED: Migraine with aura, without mention of intractable migraine without mention of status migrainosus ICD-10-CM: G43.109  ICD-9-CM: 346.00  3/3/2010 - 3/24/2016        RESOLVED: Irritable bowel syndrome ICD-10-CM: K58.9  ICD-9-CM: 564.1  3/3/2010 - 3/24/2016        RESOLVED: Insomnia, unspecified ICD-10-CM: G47.00  ICD-9-CM: 780.52  3/3/2010 - 3/24/2016              Greater than 20  minutes were spent with the patient on counseling and coordination of care    Signed:   Spenser Zarate MD  4/3/2018  10:19 AM

## 2018-04-03 NOTE — PROGRESS NOTES
Hospital PCP EUGENE LAKHANI appointment scheduled with Dr. Isa Toure on Thursday April 5, 2018 @ 2:45 p.m.  Added to AVS. Anya De La O CM Specialist

## 2018-04-03 NOTE — PROGRESS NOTES
Reason for Admission:      TIA vs CVA  Came to ER due to left sided weakness   RRAT Score:      3  Plan for utilizing home health:       None needed   Independent and self care  Likelihood of Readmission:     low     CM met with patient in her room to introduce self and explain role. Patient was dressed and ready for discharge. Patient said a friend will be transporting her home. Patient lives in her own home alone but has sold her home and is moving this weekend to an apartment in [facility] to be closer to her mother, Ad LI. She said her mother has been in rehab and needs her assistance. Patient was self care and working full time at [employer] --28 years. She plans to return to work when medically cleared. Patient confirmed PCP is Dr John Arauz and has an appointment 4/5/18. Last appointment was 3/21/18 Patient has no children-- Good friend support-- Alma Azalea [phone] is main contact-. Patient is a . Patient has [employer]-- Secures medications at [facility]- Denies any concerns in securing medications. Patient friend is transporting home today. Patient feels good and has no concerns about returning home. Care Management Interventions  PCP Verified by CM: Yes  Last Visit to PCP: 03/21/18  Transition of Care Consult (CM Consult): Discharge Planning  Discharge Durable Medical Equipment: No  Physical Therapy Consult: Yes  Occupational Therapy Consult: Yes  Current Support Network: Lives Alone (lives alone in her home and is moving this weekend to an apartment near her mother. A  and no children. . independent and self care   working full time.   AMD in chart)  Confirm Follow Up Transport: Self  Plan discussed with Pt/Family/Caregiver: Yes  Discharge Location  Discharge Placement: Home

## 2018-04-03 NOTE — PROGRESS NOTES
I have reviewed discharge instructions with the patient. The patient verbalized understanding. Bedside RN performed patient education and medication education. Discharge concerns initiated and discussed with patient, including clarification on \"who\" assists the patient at their home and instructions for when the home going patient should call their provider after discharge. Opportunity for questions and clarification was provided. Patient receptive to education: YES  Patient stated: [place] Understanding  Barriers to Education: None  Diagnosis Education given:  YES    Length of stay: 1  Expected Day of Discharge: 4/3/2018  Ask if they have \"Help at Home\" & add to white board?   YES    Education Day #: 1    Medication Education Given:  YES  M in the box Medication name: Aspirin, Lorazepam, Pravastatin, Clonidine     Pt aware of HCAHPS survey: YES        Stroke Education documented in Patient Education: YES  Core Measures Documented in [facility]:  Risk Factors: YES  Warning signs of stroke: YES  When to Activate 911: YES  Medication Education for Risk Factors: YES  Smoking cessation if applicable: YES  Written Education Given:  YES    Discharge NIH Completed: YES  Score: 0    BRAINS: YES    Follow Up Appointment Made: NO; no follow up appts ordered at this time   Date/Time if applicable: N/A

## 2018-04-03 NOTE — DISCHARGE INSTRUCTIONS
Discharge Instructions       PATIENT ID: Erin Hatch  MRN: [id number]   YOB: 1955    DATE OF ADMISSION: 4/2/2018  8:08 AM    DATE OF DISCHARGE: 4/3/2018    PRIMARY CARE PROVIDER: Violet Edwards MD     ATTENDING PHYSICIAN: Betty Sosa MD  DISCHARGING PROVIDER: Betty Sosa MD    To contact this individual call [phone] and ask the  to page. If unavailable ask to be transferred the Adult Hospitalist Department. DISCHARGE DIAGNOSES   TIA     CONSULTATIONS: IP CONSULT TO NEUROLOGY  IP CONSULT TO PSYCHIATRY  IP CONSULT TO HOSPITALIST    PROCEDURES/SURGERIES: * No surgery found *    PENDING TEST RESULTS:   At the time of discharge the following test results are still pending:     FOLLOW UP APPOINTMENTS:   Follow-up Information     Follow up With Details Comments [address], [place] In 1 week  [address]  [address]             ADDITIONAL CARE RECOMMENDATIONS:   Please take daily aspirin as advised   Low dose cholesterol medication as well   Continue to take BP medication as before and donot change dose yet and see pcp for that   Advised to get fasting cholesterol panel before starting the cholesterol pill . We could not get here as not fasting by the time I saw     DIET: Cardiac Diet  Oral Nutritional Supplements:  ACTIVITY: Activity as tolerated    WOUND CARE:     EQUIPMENT needed:       DISCHARGE MEDICATIONS:   See Medication Reconciliation Form    · It is important that you take the medication exactly as they are prescribed. · Keep your medication in the bottles provided by the pharmacist and keep a list of the medication names, dosages, and times to be taken in your wallet. · Do not take other medications without consulting your doctor. NOTIFY YOUR PHYSICIAN FOR ANY OF THE FOLLOWING:   Fever over 101 degrees for 24 hours.    Chest pain, shortness of breath, fever, chills, nausea, vomiting, diarrhea, change in mentation, falling, weakness, bleeding. Severe pain or pain not relieved by medications. Or, any other signs or symptoms that you may have questions about.       DISPOSITION:  xx  Home With:   OT  PT  HH  RN       SNF/Inpatient Rehab/LTAC    Independent/assisted living    Hospice    Other:     CDMP Checked:   Yes x     PROBLEM LIST Updated:  Yes x       Signed:   Cynthia Medina MD  4/3/2018  10:13 AM

## 2018-04-03 NOTE — ROUTINE PROCESS
Bedside and Verbal shift change report given to Shane Robledo RN (oncoming nurse) by Bhavesh Sánchez RN (offgoing nurse). Report included the following information SBAR, Kardex, Intake/Output, MAR, Accordion, Recent Results and Cardiac Rhythm NSR/SB.

## 2018-04-05 ENCOUNTER — OFFICE VISIT (OUTPATIENT)
Dept: INTERNAL MEDICINE CLINIC | Age: 63
End: 2018-04-05

## 2018-04-05 VITALS
BODY MASS INDEX: 20.63 KG/M2 | OXYGEN SATURATION: 97 % | RESPIRATION RATE: 16 BRPM | DIASTOLIC BLOOD PRESSURE: 90 MMHG | SYSTOLIC BLOOD PRESSURE: 162 MMHG | HEART RATE: 84 BPM | HEIGHT: 66 IN | TEMPERATURE: 97.9 F | WEIGHT: 128.4 LBS

## 2018-04-05 DIAGNOSIS — E78.00 HYPERCHOLESTEROLEMIA: ICD-10-CM

## 2018-04-05 DIAGNOSIS — I10 WHITE COAT SYNDROME WITH DIAGNOSIS OF HYPERTENSION: Primary | ICD-10-CM

## 2018-04-05 DIAGNOSIS — F41.9 ANXIETY: ICD-10-CM

## 2018-04-05 DIAGNOSIS — E78.5 DYSLIPIDEMIA: ICD-10-CM

## 2018-04-05 DIAGNOSIS — G45.9 TRANSIENT CEREBRAL ISCHEMIA, UNSPECIFIED TYPE: ICD-10-CM

## 2018-04-05 RX ORDER — ALPRAZOLAM 0.25 MG/1
0.25 TABLET ORAL
Qty: 30 TAB | Refills: 0 | Status: SHIPPED | OUTPATIENT
Start: 2018-04-05

## 2018-04-05 NOTE — PROGRESS NOTES
Chief Complaint   Patient presents with   [facility] Hypertension   [facility] Follow Up    Anxiety     temporary     Patient states she is here for hospital follow up. Patient states she has temporary anxiety and would like to be placed on something to help control her temporary anxiety so that her blood pressure can be stable.

## 2018-04-05 NOTE — PROGRESS NOTES
HPI:  Marcin Carrizales is a 58y.o. year old female who returns to clinic today for follow up: Recent hospital admission for possible TIA. She was discharged 4-2-18. She had been doing well with her current blood pressure medication and was checking blood pressures at home and felt they were at goal.  Her mom had an accident and she became very stressed and worried about her mother's health and noted elevation in her blood pressure. She went to the emergency room for elevated blood pressure concerns and weakness on her left side. In ER /102. The course of her hospitalization is reviewed including consultations laboratory studies and imaging studies. It was not determined whether this was a TIA versus anxiety induced versus hypertensive crisis. Since discharge -[id number]. She was given ativan for her anxiety and taking 3 tablets over the past few days and cause her to feel drowsy and she wants to take a lower dose but does not want to take half of an Ativan tablet as she feels they are too small. . She is moving next week to [facility] which is an additional stressor for her. Hugh Taylor Her weakness resolved very quickly and has had no further weakness and no sensory changes. Current Outpatient Prescriptions   Medication Sig Dispense Refill    aspirin 81 mg chewable tablet Take 1 Tab by mouth daily. 90 Tab 0    spironolactone (ALDACTONE) 25 mg tablet Take 1 Tab by mouth daily. 90 Tab 0    cloNIDine HCl (CATAPRES) 0.1 mg tablet Take 1 Tab by mouth two (2) times a day. 180 Tab 0    CALCIUM CITRATE PO Take 630 mg by mouth two (2) times a day.  ASPIRIN/SALICYLAMIDE/CAFFEINE (BC HEADACHE POWDER PO) Take  by mouth three (3) times daily as needed.  CHOLECALCIFEROL, VITAMIN D3, (VITAMIN D3 PO) Take 900 Units by mouth two (2) times a day.  ascorbic acid (VITAMIN C) 500 mg tablet Take 500 mg by mouth two (2) times a day.  BIOTIN PO Take 500 mcg by mouth daily.       DOCUSATE CALCIUM (STOOL SOFTENER PO) Take 100 mg by mouth two (2) times daily as needed.  vitamin E (AQUA GEMS) 400 unit capsule Take 400 Units by mouth daily.  fiber Cap Take 2 Caps by mouth two (2) times a day.  pravastatin (PRAVACHOL) 40 mg tablet Take 1 Tab by mouth nightly. 90 Tab 0    LORazepam (ATIVAN) 0.5 mg tablet Take 1 Tab by mouth every six (6) hours as needed for Anxiety. Max Daily Amount: 2 mg. Indications: anxiety, anxiety 12 Tab 0     Allergies   Allergen Reactions    Losartan Other (comments)     Nausea.  Sulfa (Sulfonamide Antibiotics) Rash    Vasotec [Enalapril Maleate] Other (comments)     Nausea, decreased appetite bad taste. Social History   Substance Use Topics    Smoking status: Former Smoker     Packs/day: 1.00     Years: 4.00     Quit date: 1/1/1978    Smokeless tobacco: Never Used    Alcohol use 2.4 oz/week     4 Glasses of wine, 0 Standard drinks or equivalent per week      Comment: daily         Review of Systems   Constitutional: Negative for malaise/fatigue. Respiratory: Negative for shortness of breath. Cardiovascular: Negative for chest pain and leg swelling. Gastrointestinal: Negative for abdominal pain and heartburn. Neurological: Negative for dizziness, sensory change, focal weakness and headaches. Physical Exam   Constitutional: She appears well-developed. No distress. /90  Pulse 84  Temp 97.9 °F (36.6 °C) (Oral)   Resp 16  Ht 5' 6\" (1.676 m)  Wt 128 lb 6.4 oz (58.2 kg)  LMP 04/01/2007  SpO2 97%  BMI 20.72 kg/m2   HENT:   Head: Normocephalic and atraumatic. Eyes: Conjunctivae are normal. Pupils are equal, round, and reactive to light. Neck: Carotid bruit is not present. No thyromegaly present. Cardiovascular: Normal rate, regular rhythm, normal heart sounds and intact distal pulses. No murmur heard. Pulmonary/Chest: Effort normal and breath sounds normal. She has no wheezes. Abdominal: Soft.  Bowel sounds are normal. She exhibits no distension and no mass. There is no tenderness. Musculoskeletal: She exhibits no edema. Lymphadenopathy:     She has no cervical adenopathy. Neurological:   Nonfocal   Psychiatric: She has a normal mood and affect. Vitals reviewed. Assessment & Plan:    ICD-10-CM ICD-9-CM    1. White coat syndrome with diagnosis of hypertension  Likely exacerbated by the stressors and anxiety that she has ongoing at this time. Continue with her current blood pressure medication and follow-up in 4 weeks. I10 401.9    2. Hypercholesterolemia I20.73 798.1 METABOLIC PANEL, COMPREHENSIVE      LIPID PANEL   3. Dyslipidemia  Cholesterol medication recommended at the time of her discharge but patient states it was deferred until she had her evaluation today and lab work. E78.5 272.4    4. Transient cerebral ischemia, unspecified type  Unclear whether her symptoms were TIA versus anxiety versus related to elevated hypertension. G45.9 435.9    5. Anxiety situational change Ativan to alprazolam as she wants to try a lower dose of medication. F41.9 300.00 ALPRAZolam (XANAX) 0.25 mg tablet       Orders Placed This Encounter    METABOLIC PANEL, COMPREHENSIVE    LIPID PANEL    ALPRAZolam (XANAX) 0.25 mg tablet        Follow-up Disposition:  Return in about 4 weeks (around 5/3/2018). Advised her to call back or return to office if symptoms worsen/change/persist.  Discussed expected course/resolution/complications of diagnosis in detail with patient. Medication risks/benefits/costs/interactions/alternatives discussed with patient. She was given an after visit summary which includes diagnoses, current medications, & vitals. She expressed understanding with the diagnosis and plan.

## 2018-04-24 LAB
ALBUMIN SERPL-MCNC: 4.3 G/DL (ref 3.6–4.8)
ALBUMIN/GLOB SERPL: 2.7 {RATIO} (ref 1.2–2.2)
ALP SERPL-CCNC: 51 IU/L (ref 39–117)
ALT SERPL-CCNC: 15 IU/L (ref 0–32)
AST SERPL-CCNC: 20 IU/L (ref 0–40)
BILIRUB SERPL-MCNC: 0.3 MG/DL (ref 0–1.2)
BUN SERPL-MCNC: 18 MG/DL (ref 8–27)
BUN/CREAT SERPL: 21 (ref 12–28)
CALCIUM SERPL-MCNC: 9.4 MG/DL (ref 8.7–10.3)
CHLORIDE SERPL-SCNC: 101 MMOL/L (ref 96–106)
CHOLEST SERPL-MCNC: 204 MG/DL (ref 100–199)
CO2 SERPL-SCNC: 25 MMOL/L (ref 18–29)
CREAT SERPL-MCNC: 0.86 MG/DL (ref 0.57–1)
GFR SERPLBLD CREATININE-BSD FMLA CKD-EPI: 73 ML/MIN/1.73
GFR SERPLBLD CREATININE-BSD FMLA CKD-EPI: 84 ML/MIN/1.73
GLOBULIN SER CALC-MCNC: 1.6 G/DL (ref 1.5–4.5)
GLUCOSE SERPL-MCNC: 90 MG/DL (ref 65–99)
HDLC SERPL-MCNC: 86 MG/DL
LDLC SERPL CALC-MCNC: 107 MG/DL (ref 0–99)
POTASSIUM SERPL-SCNC: 4.6 MMOL/L (ref 3.5–5.2)
PROT SERPL-MCNC: 5.9 G/DL (ref 6–8.5)
SODIUM SERPL-SCNC: 141 MMOL/L (ref 134–144)
TRIGL SERPL-MCNC: 55 MG/DL (ref 0–149)
VLDLC SERPL CALC-MCNC: 11 MG/DL (ref 5–40)

## 2018-04-27 ENCOUNTER — TELEPHONE (OUTPATIENT)
Dept: NEUROLOGY | Age: 63
End: 2018-04-27

## 2018-04-27 NOTE — TELEPHONE ENCOUNTER
Patient called stating was seen by Dr Kenji Vázquez in the [facility] ER on 04/02/18. He diagnosed her with TIA. She is request his notes and any test results from that visit be mailed to her. She is also requesting a call back to let her known that it will be mailed to the PO Box on file.  Please advise [phone]    **also requesting to lvm if she does not answer

## 2018-05-03 ENCOUNTER — OFFICE VISIT (OUTPATIENT)
Dept: INTERNAL MEDICINE CLINIC | Age: 63
End: 2018-05-03

## 2018-05-03 ENCOUNTER — TELEPHONE (OUTPATIENT)
Dept: INTERNAL MEDICINE CLINIC | Age: 63
End: 2018-05-03

## 2018-05-03 VITALS
SYSTOLIC BLOOD PRESSURE: 152 MMHG | WEIGHT: 127.4 LBS | RESPIRATION RATE: 16 BRPM | HEART RATE: 72 BPM | OXYGEN SATURATION: 98 % | HEIGHT: 66 IN | TEMPERATURE: 98.3 F | BODY MASS INDEX: 20.48 KG/M2 | DIASTOLIC BLOOD PRESSURE: 90 MMHG

## 2018-05-03 DIAGNOSIS — E78.00 PURE HYPERCHOLESTEROLEMIA: ICD-10-CM

## 2018-05-03 DIAGNOSIS — F41.9 ANXIETY: ICD-10-CM

## 2018-05-03 DIAGNOSIS — G45.9 TRANSIENT CEREBRAL ISCHEMIA, UNSPECIFIED TYPE: ICD-10-CM

## 2018-05-03 DIAGNOSIS — I10 WHITE COAT SYNDROME WITH DIAGNOSIS OF HYPERTENSION: Primary | ICD-10-CM

## 2018-05-03 RX ORDER — CLONIDINE HYDROCHLORIDE 0.1 MG/1
0.1 TABLET ORAL 2 TIMES DAILY
Qty: 180 TAB | Refills: 1 | Status: SHIPPED | OUTPATIENT
Start: 2018-05-03

## 2018-05-03 RX ORDER — PRAVASTATIN SODIUM 10 MG/1
10 TABLET ORAL
Qty: 30 TAB | Refills: 1 | Status: SHIPPED | OUTPATIENT
Start: 2018-05-03

## 2018-05-03 RX ORDER — ESCITALOPRAM OXALATE 5 MG/1
5 TABLET ORAL DAILY
Qty: 30 TAB | Refills: 0 | Status: SHIPPED | OUTPATIENT
Start: 2018-05-03

## 2018-05-03 NOTE — TELEPHONE ENCOUNTER
Notify patient lab form is for her cholesterol and liver enzyme check for her to have done prior to her 6 week visit because we started her on cholesterol-lowering medication. We want to reassess how she is doing on the medication at that time.

## 2018-05-03 NOTE — TELEPHONE ENCOUNTER
Called the patient and notified her that her lab form is for her cholesterol and liver enzyme check for her to have done prior to her 6 week visit because we started her on cholesterol-lowering medication. And that We want to reassess how she is doing on the medication at that time. Left pt this detailed message.

## 2018-05-03 NOTE — PROGRESS NOTES
Chief Complaint   Patient presents with    Hypertension     Patient states she is here for her 4 week follow up.

## 2018-05-03 NOTE — PROGRESS NOTES
HPI:  Joy Durbin is a 58y.o. year old female who returns to clinic today for follow up:   HTN with white coat elevation. She brings in a list of her blood pressures which ranged from 110-150/. She is taking her clonidine 0.1 mg twice daily with no side effects. Her blood pressures tend to be higher in the afternoon just prior to having to take her next dose of clonidine. Anxiety: She has Xanax as needed and has been using very infrequently and has only taken 6 tablets and used during her move. However she does feel anxious frequently and worries significantly regarding her blood pressure. Has never been on any SSRI for anxiety. No depression. No suicidal ideation. . She has had a lot of stressors recently. She was recently admitted to the hospital for possible TIA versus anxiety and it was recommended that she consider being on a statin medication. We have recently repeated her cholesterol and LDL is 107. Current Outpatient Prescriptions   Medication Sig Dispense Refill    aspirin 81 mg chewable tablet Take 1 Tab by mouth daily. 90 Tab 0    spironolactone (ALDACTONE) 25 mg tablet Take 1 Tab by mouth daily. 90 Tab 0    cloNIDine HCl (CATAPRES) 0.1 mg tablet Take 1 Tab by mouth two (2) times a day. 180 Tab 0    CALCIUM CITRATE PO Take 630 mg by mouth two (2) times a day.  ASPIRIN/SALICYLAMIDE/CAFFEINE (BC HEADACHE POWDER PO) Take  by mouth three (3) times daily as needed.  CHOLECALCIFEROL, VITAMIN D3, (VITAMIN D3 PO) Take 900 Units by mouth two (2) times a day.  ascorbic acid (VITAMIN C) 500 mg tablet Take 500 mg by mouth two (2) times a day.  BIOTIN PO Take 500 mcg by mouth daily.  DOCUSATE CALCIUM (STOOL SOFTENER PO) Take 100 mg by mouth two (2) times daily as needed.  vitamin E (AQUA GEMS) 400 unit capsule Take 400 Units by mouth daily.  fiber Cap Take 2 Caps by mouth two (2) times a day.       ALPRAZolam (XANAX) 0.25 mg tablet Take 1 Tab by mouth three (3) times daily as needed for Anxiety. Max Daily Amount: 0.75 mg. As needed for anxiety 30 Tab 0     Allergies   Allergen Reactions    Losartan Other (comments)     Nausea.  Sulfa (Sulfonamide Antibiotics) Rash    Vasotec [Enalapril Maleate] Other (comments)     Nausea, decreased appetite bad taste. Social History   Substance Use Topics    Smoking status: Former Smoker     Packs/day: 1.00     Years: 4.00     Quit date: 1/1/1978    Smokeless tobacco: Never Used    Alcohol use 2.4 oz/week     4 Glasses of wine, 0 Standard drinks or equivalent per week      Comment: daily         Review of Systems   Respiratory: Negative for shortness of breath. Cardiovascular: Negative for chest pain and leg swelling. Gastrointestinal: Negative for abdominal pain. Physical Exam   Constitutional: She appears well-developed. No distress. /90  Pulse 72  Temp 98.3 °F (36.8 °C) (Oral)   Resp 16  Ht 5' 6\" (1.676 m)  Wt 127 lb 6.4 oz (57.8 kg)  LMP 04/01/2007  SpO2 98%  BMI 20.56 kg/m2   Cardiovascular: Normal rate, regular rhythm, normal heart sounds and intact distal pulses. No murmur heard. Pulmonary/Chest: Effort normal and breath sounds normal. She has no wheezes. Abdominal: Soft. Bowel sounds are normal. She exhibits no distension and no mass. There is no tenderness. Musculoskeletal: She exhibits no edema. Psychiatric: She has a normal mood and affect. Vitals reviewed. Assessment & Plan:    ICD-10-CM ICD-9-CM    1. White coat syndrome with diagnosis of hypertension. Blood pressure at times well controlled and at times is elevated. We will continue with clonidine 0.1 mg twice daily I think she might benefit from an increase in either clonidine or an additional blood pressure medication. It is difficult to tell with her blood pressures due to her level of anxiety and whitecoat hypertension how well they are controlled.   At this time she does not want to change her medication or add anything additional.  Continue work on lifestyle changes avoidance of salt exercise relaxation techniques. I10 401.9    2. Anxiety  I feel she would benefit from anxiety medication such as an SSRI and I recommend Lexapro at a very low-dose 5 mg daily. Reviewed potential side effects of medication and she states she will consider whether she is going to take it. If she does start the medication would like to see her back in 1 month. F41.9 300.00    3. Transient cerebral ischemia, unspecified type  It remains unclear whether she had a TIA or not causing her symptoms however as we are not able to completely rule it out I would recommend we manage her cholesterol for LDL less than 100. G45.9 435.9    4. Pure hypercholesterolemia  Start pravastatin 10 mg nightly and reviewed potential side effects. E78.00 272.0 LIPID PANEL      METABOLIC PANEL, COMPREHENSIVE        Follow-up Disposition:  Return in about 6 weeks (around 6/14/2018). Advised her to call back or return to office if symptoms worsen/change/persist.  Discussed expected course/resolution/complications of diagnosis in detail with patient. Medication risks/benefits/costs/interactions/alternatives discussed with patient. She was given an after visit summary which includes diagnoses, current medications, & vitals. She expressed understanding with the diagnosis and plan.

## 2018-05-03 NOTE — PATIENT INSTRUCTIONS
Anxiety Disorder: Care Instructions  Your Care Instructions    Anxiety is a normal reaction to stress. Difficult situations can cause you to have symptoms such as sweaty palms and a nervous feeling. In an anxiety disorder, the symptoms are far more severe. Constant worry, muscle tension, trouble sleeping, nausea and diarrhea, and other symptoms can make normal daily activities difficult or impossible. These symptoms may occur for no reason, and they can affect your work, school, or social life. Medicines, counseling, and self-care can all help. Follow-up care is a key part of your treatment and safety. Be sure to make and go to all appointments, and call your doctor if you are having problems. It's also a good idea to know your test results and keep a list of the medicines you take. How can you care for yourself at home? · Take medicines exactly as directed. Call your doctor if you think you are having a problem with your medicine. · Go to your counseling sessions and follow-up appointments. · Recognize and accept your anxiety. Then, when you are in a situation that makes you anxious, say to yourself, \"This is not an emergency. I feel uncomfortable, but I am not in danger. I can keep going even if I feel anxious. \"  · Be kind to your body:  ¨ Relieve tension with exercise or a massage. ¨ Get enough rest.  ¨ Avoid alcohol, caffeine, nicotine, and illegal drugs. They can increase your anxiety level and cause sleep problems. ¨ Learn and do relaxation techniques. See below for more about these techniques. · Engage your mind. Get out and do something you enjoy. Go to a funny movie, or take a walk or hike. Plan your day. Having too much or too little to do can make you anxious. · Keep a record of your symptoms. Discuss your fears with a good friend or family member, or join a support group for people with similar problems. Talking to others sometimes relieves stress.   · Get involved in social groups, or volunteer to help others. Being alone sometimes makes things seem worse than they are. · Get at least 30 minutes of exercise on most days of the week to relieve stress. Walking is a good choice. You also may want to do other activities, such as running, swimming, cycling, or playing tennis or team sports. Relaxation techniques  Do relaxation exercises 10 to 20 minutes a day. You can play soothing, relaxing music while you do them, if you wish. · Tell others in your house that you are going to do your relaxation exercises. Ask them not to disturb you. · Find a comfortable place, away from all distractions and noise. · Lie down on your back, or sit with your back straight. · Focus on your breathing. Make it slow and steady. · Breathe in through your nose. Breathe out through either your nose or mouth. · Breathe deeply, filling up the area between your navel and your rib cage. Breathe so that your belly goes up and down. · Do not hold your breath. · Breathe like this for 5 to 10 minutes. Notice the feeling of calmness throughout your whole body. As you continue to breathe slowly and deeply, relax by doing the following for another 5 to 10 minutes:  · Tighten and relax each muscle group in your body. You can begin at your toes and work your way up to your head. · Imagine your muscle groups relaxing and becoming heavy. · Empty your mind of all thoughts. · Let yourself relax more and more deeply. · Become aware of the state of calmness that surrounds you. · When your relaxation time is over, you can bring yourself back to alertness by moving your fingers and toes and then your hands and feet and then stretching and moving your entire body. Sometimes people fall asleep during relaxation, but they usually wake up shortly afterward. · Always give yourself time to return to full alertness before you drive a car or do anything that might cause an accident if you are not fully alert.  Never play a relaxation tape while you drive a car. When should you call for help? Call 911 anytime you think you may need emergency care. For example, call if:  ? · You feel you cannot stop from hurting yourself or someone else. ? Keep the numbers for these national suicide hotlines: 6-921-491-TALK (4-243.585.7911) and 2-759-UIODEEW (6-101.157.2491). If you or someone you know talks about suicide or feeling hopeless, get help right away. ? Watch closely for changes in your health, and be sure to contact your doctor if:  ? · You have anxiety or fear that affects your life. ? · You have symptoms of anxiety that are new or different from those you had before. Where can you learn more? Go to http://edward-enrique.info/. Enter P754 in the search box to learn more about \"Anxiety Disorder: Care Instructions. \"  Current as of: May 12, 2017  Content Version: 11.4  © 8688-2864 [employer]. Care instructions adapted under license by [employer] (which disclaims liability or warranty for this information). If you have questions about a medical condition or this instruction, always ask your healthcare professional. [address] any warranty or liability for your use of this information.

## 2018-05-08 ENCOUNTER — TELEPHONE (OUTPATIENT)
Dept: INTERNAL MEDICINE CLINIC | Age: 63
End: 2018-05-08

## 2018-06-13 ENCOUNTER — HOSPITAL ENCOUNTER (OUTPATIENT)
Dept: MAMMOGRAPHY | Age: 63
Discharge: HOME | End: 2018-06-13
Attending: OBSTETRICS & GYNECOLOGY
Payer: COMMERCIAL

## 2018-06-13 DIAGNOSIS — Z12.31 VISIT FOR SCREENING MAMMOGRAM: ICD-10-CM

## 2018-06-13 PROCEDURE — 77063 BREAST TOMOSYNTHESIS BI: CPT

## 2019-06-26 ENCOUNTER — HOSPITAL ENCOUNTER (OUTPATIENT)
Dept: MAMMOGRAPHY | Age: 64
Discharge: HOME | End: 2019-06-26
Attending: FAMILY MEDICINE
Payer: COMMERCIAL

## 2019-06-26 DIAGNOSIS — Z12.31 VISIT FOR SCREENING MAMMOGRAM: ICD-10-CM

## 2019-06-26 PROCEDURE — 77063 BREAST TOMOSYNTHESIS BI: CPT
